# Patient Record
Sex: FEMALE | Race: WHITE | Employment: FULL TIME | URBAN - METROPOLITAN AREA
[De-identification: names, ages, dates, MRNs, and addresses within clinical notes are randomized per-mention and may not be internally consistent; named-entity substitution may affect disease eponyms.]

---

## 2020-02-01 ENCOUNTER — APPOINTMENT (OUTPATIENT)
Dept: GENERAL RADIOLOGY | Facility: HOSPITAL | Age: 45
DRG: 872 | End: 2020-02-01
Attending: EMERGENCY MEDICINE
Payer: COMMERCIAL

## 2020-02-01 ENCOUNTER — HOSPITAL ENCOUNTER (INPATIENT)
Facility: HOSPITAL | Age: 45
LOS: 6 days | Discharge: NURSING, REHAB OR LONG-TERM CARE | DRG: 872 | End: 2020-02-07
Attending: EMERGENCY MEDICINE | Admitting: HOSPITALIST
Payer: COMMERCIAL

## 2020-02-01 ENCOUNTER — APPOINTMENT (OUTPATIENT)
Dept: ULTRASOUND IMAGING | Facility: HOSPITAL | Age: 45
DRG: 872 | End: 2020-02-01
Attending: EMERGENCY MEDICINE
Payer: COMMERCIAL

## 2020-02-01 DIAGNOSIS — L03.116 LEFT LEG CELLULITIS: Primary | ICD-10-CM

## 2020-02-01 LAB
ALBUMIN SERPL-MCNC: 2.8 G/DL (ref 3.4–5)
ALBUMIN/GLOB SERPL: 0.5 {RATIO} (ref 1–2)
ALP LIVER SERPL-CCNC: 124 U/L (ref 37–98)
ALT SERPL-CCNC: 24 U/L (ref 13–56)
ANION GAP SERPL CALC-SCNC: 9 MMOL/L (ref 0–18)
AST SERPL-CCNC: 18 U/L (ref 15–37)
BASOPHILS # BLD: 0 X10(3) UL (ref 0–0.2)
BASOPHILS NFR BLD: 0 %
BILIRUB SERPL-MCNC: 0.5 MG/DL (ref 0.1–2)
BUN BLD-MCNC: 19 MG/DL (ref 7–18)
BUN/CREAT SERPL: 18.6 (ref 10–20)
CALCIUM BLD-MCNC: 9 MG/DL (ref 8.5–10.1)
CHLORIDE SERPL-SCNC: 100 MMOL/L (ref 98–112)
CO2 SERPL-SCNC: 29 MMOL/L (ref 21–32)
CREAT BLD-MCNC: 1.02 MG/DL (ref 0.55–1.02)
DEPRECATED RDW RBC AUTO: 54.9 FL (ref 35.1–46.3)
EOSINOPHIL # BLD: 0.21 X10(3) UL (ref 0–0.7)
EOSINOPHIL NFR BLD: 1 %
ERYTHROCYTE [DISTWIDTH] IN BLOOD BY AUTOMATED COUNT: 14.8 % (ref 11–15)
GLOBULIN PLAS-MCNC: 5.6 G/DL (ref 2.8–4.4)
GLUCOSE BLD-MCNC: 131 MG/DL (ref 70–99)
HCT VFR BLD AUTO: 41.1 % (ref 35–48)
HGB BLD-MCNC: 13 G/DL (ref 12–16)
LACTATE SERPL-SCNC: 1.4 MMOL/L (ref 0.4–2)
LACTATE SERPL-SCNC: 2.8 MMOL/L (ref 0.4–2)
LYMPHOCYTES NFR BLD: 0.21 X10(3) UL (ref 1–4)
LYMPHOCYTES NFR BLD: 1 %
M PROTEIN MFR SERPL ELPH: 8.4 G/DL (ref 6.4–8.2)
MCH RBC QN AUTO: 31.6 PG (ref 26–34)
MCHC RBC AUTO-ENTMCNC: 31.6 G/DL (ref 31–37)
MCV RBC AUTO: 99.8 FL (ref 80–100)
MONOCYTES # BLD: 0.21 X10(3) UL (ref 0.1–1)
MONOCYTES NFR BLD: 1 %
MORPHOLOGY: NORMAL
NEUTROPHILS # BLD AUTO: 18.24 X10 (3) UL (ref 1.5–7.7)
NEUTROPHILS NFR BLD: 94 %
NEUTS BAND NFR BLD: 3 %
NEUTS HYPERSEG # BLD: 20.47 X10(3) UL (ref 1.5–7.7)
OSMOLALITY SERPL CALC.SUM OF ELEC: 290 MOSM/KG (ref 275–295)
PLATELET # BLD AUTO: 221 10(3)UL (ref 150–450)
PLATELET MORPHOLOGY: NORMAL
POTASSIUM SERPL-SCNC: 3.5 MMOL/L (ref 3.5–5.1)
RBC # BLD AUTO: 4.12 X10(6)UL (ref 3.8–5.3)
SODIUM SERPL-SCNC: 138 MMOL/L (ref 136–145)
TOTAL CELLS COUNTED: 100
WBC # BLD AUTO: 21.1 X10(3) UL (ref 4–11)

## 2020-02-01 PROCEDURE — 99223 1ST HOSP IP/OBS HIGH 75: CPT | Performed by: INTERNAL MEDICINE

## 2020-02-01 PROCEDURE — 73590 X-RAY EXAM OF LOWER LEG: CPT | Performed by: EMERGENCY MEDICINE

## 2020-02-01 PROCEDURE — 93971 EXTREMITY STUDY: CPT | Performed by: EMERGENCY MEDICINE

## 2020-02-01 RX ORDER — CEFAZOLIN SODIUM/WATER 2 G/20 ML
2 SYRINGE (ML) INTRAVENOUS ONCE
Status: COMPLETED | OUTPATIENT
Start: 2020-02-01 | End: 2020-02-01

## 2020-02-01 RX ORDER — SODIUM CHLORIDE 9 MG/ML
INJECTION, SOLUTION INTRAVENOUS CONTINUOUS
Status: DISCONTINUED | OUTPATIENT
Start: 2020-02-01 | End: 2020-02-05

## 2020-02-01 RX ORDER — CEFAZOLIN SODIUM/WATER 2 G/20 ML
2 SYRINGE (ML) INTRAVENOUS EVERY 8 HOURS
Status: DISCONTINUED | OUTPATIENT
Start: 2020-02-01 | End: 2020-02-02

## 2020-02-01 RX ORDER — ONDANSETRON 2 MG/ML
4 INJECTION INTRAMUSCULAR; INTRAVENOUS EVERY 6 HOURS PRN
Status: DISCONTINUED | OUTPATIENT
Start: 2020-02-01 | End: 2020-02-07

## 2020-02-01 RX ORDER — ACETAMINOPHEN 325 MG/1
650 TABLET ORAL EVERY 6 HOURS PRN
Status: DISCONTINUED | OUTPATIENT
Start: 2020-02-01 | End: 2020-02-07

## 2020-02-01 RX ORDER — METOCLOPRAMIDE HYDROCHLORIDE 5 MG/ML
10 INJECTION INTRAMUSCULAR; INTRAVENOUS EVERY 8 HOURS PRN
Status: DISCONTINUED | OUTPATIENT
Start: 2020-02-01 | End: 2020-02-07

## 2020-02-01 RX ORDER — IBUPROFEN 400 MG/1
400 TABLET ORAL EVERY 6 HOURS PRN
Status: DISCONTINUED | OUTPATIENT
Start: 2020-02-01 | End: 2020-02-07

## 2020-02-01 RX ORDER — MAGNESIUM OXIDE 400 MG (241.3 MG MAGNESIUM) TABLET
1 TABLET NIGHTLY PRN
Status: DISCONTINUED | OUTPATIENT
Start: 2020-02-01 | End: 2020-02-07

## 2020-02-01 RX ORDER — HEPARIN SODIUM 5000 [USP'U]/ML
5000 INJECTION, SOLUTION INTRAVENOUS; SUBCUTANEOUS EVERY 8 HOURS SCHEDULED
Status: DISCONTINUED | OUTPATIENT
Start: 2020-02-01 | End: 2020-02-03

## 2020-02-01 NOTE — ED PROVIDER NOTES
Patient Seen in: [facility] Emergency Department      History   Patient presents with:  Cellulitis    Stated Complaint: lower left leg swelling/redness/blistering, r/o cellulitis    HPI    Patient has not seen a doctor in more than 5 years.   She rec oral lesions. Lungs: Clear to auscultation bilaterally. No rhonchi or rales. Heart: Normal S1 and S2, without murmur or rub. Distal pulses are strong and symmetric. Abdomen: Soft, morbidly obese but nondistended and completely nontender.   Extremities: Abnormal            Final result                 Please view results for these tests on the individual orders.    LACTIC ACID 3 HR POST POSITIVE   RAINBOW DRAW BLUE   RAINBOW DRAW LAVENDER   RAINBOW DRAW LIGHT GREEN   RAINBOW DRAW GOLD   BLOOD CULT Prescribed:  There are no discharge medications for this patient.                  Present on Admission           ICD-10-CM Noted POA    Left leg cellulitis L03.116 2/1/2020 Unknown

## 2020-02-01 NOTE — H&P
[facility]  History and Physical     Haley June Patient Status:  Emergency    1975 MRN [id number]   Location 6[address] [facility] Attending Duc León MD   Hosp Day # 0 PCP No primary care provider on file.      [place] T[place] Abdomen: Soft, nontender, nondistended. Positive bowel sounds. No rebound, guarding or organomegaly. Neurologic: No focal neurological deficits. CNII-XII grossly intact. Musculoskeletal: Moves all extremities. Extremities: No edema or cyanosis.   Integu

## 2020-02-02 LAB
ANION GAP SERPL CALC-SCNC: 9 MMOL/L (ref 0–18)
BASOPHILS # BLD: 0 X10(3) UL (ref 0–0.2)
BASOPHILS NFR BLD: 0 %
BUN BLD-MCNC: 15 MG/DL (ref 7–18)
BUN/CREAT SERPL: 19.5 (ref 10–20)
CALCIUM BLD-MCNC: 7.8 MG/DL (ref 8.5–10.1)
CHLORIDE SERPL-SCNC: 102 MMOL/L (ref 98–112)
CHOLEST SMN-MCNC: 99 MG/DL (ref ?–200)
CO2 SERPL-SCNC: 27 MMOL/L (ref 21–32)
CREAT BLD-MCNC: 0.77 MG/DL (ref 0.55–1.02)
DEPRECATED RDW RBC AUTO: 55.6 FL (ref 35.1–46.3)
EOSINOPHIL # BLD: 0.18 X10(3) UL (ref 0–0.7)
EOSINOPHIL NFR BLD: 1 %
ERYTHROCYTE [DISTWIDTH] IN BLOOD BY AUTOMATED COUNT: 15 % (ref 11–15)
EST. AVERAGE GLUCOSE BLD GHB EST-MCNC: 146 MG/DL (ref 68–126)
GLUCOSE BLD-MCNC: 126 MG/DL (ref 70–99)
HBA1C MFR BLD HPLC: 6.7 % (ref ?–5.7)
HCT VFR BLD AUTO: 38.7 % (ref 35–48)
HDLC SERPL-MCNC: 9 MG/DL (ref 40–59)
HGB BLD-MCNC: 12 G/DL (ref 12–16)
LDLC SERPL CALC-MCNC: 59 MG/DL (ref ?–100)
LYMPHOCYTES NFR BLD: 1.76 X10(3) UL (ref 1–4)
LYMPHOCYTES NFR BLD: 10 %
MCH RBC QN AUTO: 31.3 PG (ref 26–34)
MCHC RBC AUTO-ENTMCNC: 31 G/DL (ref 31–37)
MCV RBC AUTO: 100.8 FL (ref 80–100)
METAMYELOCYTES # BLD: 0.35 X10(3) UL
METAMYELOCYTES NFR BLD: 2 %
MONOCYTES # BLD: 0.18 X10(3) UL (ref 0.1–1)
MONOCYTES NFR BLD: 1 %
NEUTROPHILS # BLD AUTO: 13.99 X10 (3) UL (ref 1.5–7.7)
NEUTROPHILS NFR BLD: 81 %
NEUTS BAND NFR BLD: 5 %
NEUTS HYPERSEG # BLD: 15.14 X10(3) UL (ref 1.5–7.7)
NONHDLC SERPL-MCNC: 90 MG/DL (ref ?–130)
OSMOLALITY SERPL CALC.SUM OF ELEC: 288 MOSM/KG (ref 275–295)
PLATELET # BLD AUTO: 192 10(3)UL (ref 150–450)
POTASSIUM SERPL-SCNC: 3.7 MMOL/L (ref 3.5–5.1)
RBC # BLD AUTO: 3.84 X10(6)UL (ref 3.8–5.3)
SODIUM SERPL-SCNC: 138 MMOL/L (ref 136–145)
TOTAL CELLS COUNTED: 100
TRIGL SERPL-MCNC: 155 MG/DL (ref 30–149)
VLDLC SERPL CALC-MCNC: 31 MG/DL (ref 0–30)
WBC # BLD AUTO: 17.6 X10(3) UL (ref 4–11)

## 2020-02-02 PROCEDURE — 99232 SBSQ HOSP IP/OBS MODERATE 35: CPT | Performed by: INTERNAL MEDICINE

## 2020-02-02 RX ORDER — POTASSIUM CHLORIDE 20 MEQ/1
40 TABLET, EXTENDED RELEASE ORAL ONCE
Status: COMPLETED | OUTPATIENT
Start: 2020-02-02 | End: 2020-02-02

## 2020-02-02 RX ORDER — CLINDAMYCIN PHOSPHATE 900 MG/50ML
900 INJECTION INTRAVENOUS EVERY 8 HOURS
Status: DISCONTINUED | OUTPATIENT
Start: 2020-02-02 | End: 2020-02-07

## 2020-02-02 RX ORDER — TRAMADOL HYDROCHLORIDE 50 MG/1
50 TABLET ORAL EVERY 6 HOURS PRN
Status: DISCONTINUED | OUTPATIENT
Start: 2020-02-02 | End: 2020-02-07

## 2020-02-02 NOTE — PLAN OF CARE
NURSING ADMISSION NOTE      Patient admitted via Cart  Oriented to room. Safety precautions initiated. Bed in low position. Call light in reach. Admission navigator completed. Dr. Dhiraj Bronson saw patient in ER.

## 2020-02-02 NOTE — PLAN OF CARE
Complained of discomfort in left leg, tylenol given with some effect. New order for motrin , same given. Iv antibiotics given. melatonin for sleep. Left leg elevated.

## 2020-02-02 NOTE — CONSULTS
INFECTIOUS DISEASE CONSULTATION    Lian Carvalho Patient Status:  Inpatient    1975 MRN [id number]   [facility] 4NW-A Attending Aj Lutz MD   [facility] Day # 1 PCP No primary ca (MOTRIN) tab 400 mg, 400 mg, Oral, Q6H PRN  •  melatonin tab TABS 1 mg, 1 mg, Oral, Nightly PRN  No current facility-administered medications on file prior to encounter. No current outpatient medications on file prior to encounter.       Review of Systems seen N/A    Aerobic Smear No organisms seen N/A       Established Problem list:  Patient Active Problem List:     Left leg cellulitis     Sepsis ([facility] [address]     Benign essential HTN      Illness risk level  Right leg cellulitis risk to bodily function, systemic

## 2020-02-02 NOTE — PROGRESS NOTES
[facility]  Progress Note     kameron Garcia Patient Status:  Inpatient    1975 MRN [id number]   [facility] 4NW-A Attending Denise Hyatt MD   [facility] Day # 1 PCP No primary care provider on file.      Chief Complaint: L leg c Intravenous Q8H       ASSESSMENT / PLAN:     1. LLE Cellulitis  1. Continue IV abx  2. Follow cultures  3. US negative for DVT  4. ID to eval this AM  2. Sepsis      1. Received fluid bolus in ED  2. IVF  3. IV abx  4. Trend lactic, now normal  5.  Monitor

## 2020-02-03 LAB — POTASSIUM SERPL-SCNC: 4 MMOL/L (ref 3.5–5.1)

## 2020-02-03 PROCEDURE — 99232 SBSQ HOSP IP/OBS MODERATE 35: CPT | Performed by: INTERNAL MEDICINE

## 2020-02-03 RX ORDER — HEPARIN SODIUM 5000 [USP'U]/ML
7500 INJECTION, SOLUTION INTRAVENOUS; SUBCUTANEOUS EVERY 8 HOURS SCHEDULED
Status: DISCONTINUED | OUTPATIENT
Start: 2020-02-03 | End: 2020-02-07

## 2020-02-03 NOTE — PLAN OF CARE
1915 received patient in handoff. Denies need for pain med at this time but requests at [facility] 34 as she slept poorly last night. Left leg continues red and swollen. Redness marked. Warm to touch. Up to bathroom with SBA. States much less painful tonight to walk.

## 2020-02-03 NOTE — PROGRESS NOTES
[facility]                INFECTIOUS DISEASE PROGRESS NOTE    Yamilex Da Patient Status:  Inpatient    1975 MRN [id number]   [facility] 4NW-A Attending Cyntha Leventhal, MD   [facility] Day # 2 PCP No primary care provider on Status: Abnormal (Preliminary result)    Collection Time: 02/01/20  2:34 PM   Result Value Ref Range    Aerobic Culture Result (A) N/A     1+ growth Streptococcus pyogenes (Grp A beta strep)    Aerobic Smear No WBCs seen N/A    Aerobic Smear No organisms s

## 2020-02-03 NOTE — PROGRESS NOTES
[facility]  Progress Note     Stephane Bates Patient Status:  Inpatient    1975 MRN [id number]   [facility] 4NW-A Attending Rubén Diamond MD   [address] Day # 2 PCP No primary care provider on file.      Chief Complaint: L leg c in [place]. Medications:   • clindamycin  900 mg Intravenous Q8H   • penicillin G potassium  4 Million Units Intravenous Q4H   • Heparin Sodium (Porcine)  5,000 Units Subcutaneous Q8H [address]       ASSESSMENT / PLAN:     1. LLE Cellulitis  1.  Continue IV abx p

## 2020-02-03 NOTE — CM/SW NOTE
Patient noted to not have a primary care MD. [address] [facility] MD list given. CM/SW contact information given if additional needs arise.     Myra PIEDRA, 02/03/20, 3:48 PM

## 2020-02-03 NOTE — PLAN OF CARE
A&Ox4, VSS. Afebrile this shift. Up with standby assist. Rosangela Shaver provided as well to help with mobility. LLE cellulitis- blisters popped, leg weeping. Elevated extremity on pillows.  Leg cleansed with sterile water this evening, patient reported leg felt b Advance activity as appropriate  - Communicate ordered activity level and limitations with patient/family  Outcome: Progressing  Goal: Maintain proper alignment of affected body part  Description  INTERVENTIONS:  - Support and protect limb and body alignme devices as appropriate  - Consider OT/PT consult to assist with strengthening/mobility  - Encourage toileting schedule  Outcome: Progressing

## 2020-02-03 NOTE — PAYOR COMM NOTE
--------------  ADMISSION REVIEW     Payor: Heather Vandivermikayla JAIMES #:  [id number]  Authorization Number: [id number]    Admit date: 2/1/20  Admit time: 1805       Patient Seen in: [facility] Emergency Department    History   Patient presents with:  Meaghan left lower leg is brightly erythematous and swollen. There is some partial skin thickness ulceration noted over the anterior shin with weeping of serous fluid. The erythema is fairly sharply demarcated and extends to about the level of the knee.   The kne antibiotic    Disposition and Plan     Clinical Impression:  Left leg cellulitis  (primary encounter diagnosis)    Disposition:  Admit  2/1/2020  5:35 pm        [facility]  History and Physical     Chief Complaint: L leg cellulitis     History of P K 3.5      CO2 29.0   ALKPHO 124*   AST 18   ALT 24   BILT 0.5   TP 8.4*     ASSESSMENT / PLAN:     1. LLE Cellulitis  1. Continue IV abx  2. Follow cultures  3. F/U US  4. Consider ID eval if no improvement  2. Sepsis   1.  Received fluid bolus in    1. LLE Cellulitis  1. Continue IV abx  2. Follow cultures  3. US negative for DVT  4. ID to eval this AM  2. Sepsis      1. Received fluid bolus in ED  2. IVF  3. IV abx  4. Trend lactic, now normal  5. Monitor CBC  3. Morbid Obesity  1. BMI 51  2.  Ad Intravenous Q4H   • Heparin Sodium (Porcine)  5,000 Units Subcutaneous [facility]         ASSESSMENT / PLAN:      1. LLE Cellulitis  1. Continue IV abx per ID  2. Follow cultures  3. US negative for DVT  4. ID following  2. Sepsis      1. VS improved  2.  IVF Action Dose Route     2/2/2020 2208 Given 50 mg Oral

## 2020-02-03 NOTE — PROGRESS NOTES
[facility] Note:  Anticoagulation Weight Dose Adjustment for heparin    Chantel Foster is a 38year old female who has been prescribed heparin 5000 units every 8 hours. Estimated Creatinine Clearance: 79.7 mL/min (based on SCr of 0.77 mg/dL).  Body

## 2020-02-04 ENCOUNTER — APPOINTMENT (OUTPATIENT)
Dept: MRI IMAGING | Facility: HOSPITAL | Age: 45
DRG: 872 | End: 2020-02-04
Attending: INTERNAL MEDICINE
Payer: COMMERCIAL

## 2020-02-04 LAB
ANION GAP SERPL CALC-SCNC: 5 MMOL/L (ref 0–18)
BASOPHILS # BLD: 0 X10(3) UL (ref 0–0.2)
BASOPHILS NFR BLD: 0 %
BUN BLD-MCNC: 11 MG/DL (ref 7–18)
BUN/CREAT SERPL: 16.9 (ref 10–20)
CALCIUM BLD-MCNC: 8.3 MG/DL (ref 8.5–10.1)
CHLORIDE SERPL-SCNC: 102 MMOL/L (ref 98–112)
CO2 SERPL-SCNC: 27 MMOL/L (ref 21–32)
CREAT BLD-MCNC: 0.65 MG/DL (ref 0.55–1.02)
DEPRECATED RDW RBC AUTO: 58.1 FL (ref 35.1–46.3)
EOSINOPHIL # BLD: 0.18 X10(3) UL (ref 0–0.7)
EOSINOPHIL NFR BLD: 1 %
ERYTHROCYTE [DISTWIDTH] IN BLOOD BY AUTOMATED COUNT: 15.9 % (ref 11–15)
GLUCOSE BLD-MCNC: 127 MG/DL (ref 70–99)
HCT VFR BLD AUTO: 34.5 % (ref 35–48)
HGB BLD-MCNC: 10.7 G/DL (ref 12–16)
LYMPHOCYTES NFR BLD: 17 %
LYMPHOCYTES NFR BLD: 3.08 X10(3) UL (ref 1–4)
MCH RBC QN AUTO: 30.9 PG (ref 26–34)
MCHC RBC AUTO-ENTMCNC: 31 G/DL (ref 31–37)
MCV RBC AUTO: 99.7 FL (ref 80–100)
METAMYELOCYTES # BLD: 0.18 X10(3) UL
METAMYELOCYTES NFR BLD: 1 %
MONOCYTES # BLD: 0.54 X10(3) UL (ref 0.1–1)
MONOCYTES NFR BLD: 3 %
MORPHOLOGY: NORMAL
NEUTROPHILS # BLD AUTO: 12.8 X10 (3) UL (ref 1.5–7.7)
NEUTROPHILS NFR BLD: 69 %
NEUTS BAND NFR BLD: 9 %
NEUTS HYPERSEG # BLD: 14.12 X10(3) UL (ref 1.5–7.7)
OSMOLALITY SERPL CALC.SUM OF ELEC: 279 MOSM/KG (ref 275–295)
PLATELET # BLD AUTO: 196 10(3)UL (ref 150–450)
PLATELET MORPHOLOGY: NORMAL
POTASSIUM SERPL-SCNC: 4 MMOL/L (ref 3.5–5.1)
RBC # BLD AUTO: 3.46 X10(6)UL (ref 3.8–5.3)
SODIUM SERPL-SCNC: 134 MMOL/L (ref 136–145)
TOTAL CELLS COUNTED: 100
WBC # BLD AUTO: 18.1 X10(3) UL (ref 4–11)

## 2020-02-04 PROCEDURE — 73720 MRI LWR EXTREMITY W/O&W/DYE: CPT | Performed by: INTERNAL MEDICINE

## 2020-02-04 PROCEDURE — 99232 SBSQ HOSP IP/OBS MODERATE 35: CPT | Performed by: INTERNAL MEDICINE

## 2020-02-04 NOTE — PLAN OF CARE
1915 received patient in handoff. L arm appeared more swollen and patient stated it hurt. IV removed and restarted in RFA. Left leg with many more blisters unbroken and broken, Leg still red and warm to touch. Very swollen. Elevated on 2 pillows.  Leg wrap

## 2020-02-04 NOTE — PLAN OF CARE
Patient received this am alert and oriented, resting in bed, lungs diminished on 2 liters nasal cannula, abdomen soft, bowel sounds present, passing flatus, voiding adequate amounts, asymptomatic, patient denies pain while leg is elevated in bed, dressing.

## 2020-02-04 NOTE — CONSULTS
[facility]  Report of Inpatient Wound Care Consultation     Yamilex Roberson Patient Status:  Inpatient    1975 MRN [id number]   [facility] 4NW-A 407/407-A Attending Cyntha Leventhal, [address] [facility] Day # 3 PCP No primary care provider Length (cm): 20cm  Width (cm): circumferential  Depth (cm): 0.1    Hypergranulation: No    Tunneling:  No    Undermining:  No    Sinus Tract:  No     Wound Description:    Wound Encounter: Initial  Thickness: Partial Thickness  Exudate Amount:  Moderate patient. Please call me at the Inpatient Wound Care pager at #[phone] if you have any questions about this consultation and plan of care. Time Spent 30 Minutes.     Thank you,     Gloria Beckford RN, BSN, [facility]

## 2020-02-04 NOTE — PROGRESS NOTES
[facility]  Progress Note     Gumaro Key Patient Status:  Inpatient    1975 MRN [id number]   [facility] 4NW-A Attending Perfecto Sullivan MD   [facility] Day # 3 PCP No primary care provider on file.      Chief Complaint: L leg c mL/min (based on SCr of 0.65 mg/dL). No results for input(s): PTP, INR in the last 168 hours. No results for input(s): TROP, CK in the last 168 hours. Imaging: Imaging data reviewed in Epic.     Medications:   • Heparin Sodium (Porcine)  7,500

## 2020-02-04 NOTE — PROGRESS NOTES
[facility]                INFECTIOUS DISEASE PROGRESS NOTE    Yamilex Da Patient Status:  Inpatient    1975 MRN [id number]   [facility] 4NW-A Attending Cyntha Leventhal, MD   [facility] Day # 3 PCP No primary care provider on Range    Blood Culture Result No Growth 2 Days N/A   2.  AEROBIC BACTERIAL CULTURE     Status: Abnormal    Collection Time: 02/01/20  2:34 PM   Result Value Ref Range    Aerobic Culture Result (A) N/A     1+ growth Streptococcus pyogenes (Grp A beta strep)

## 2020-02-05 LAB
ANION GAP SERPL CALC-SCNC: 3 MMOL/L (ref 0–18)
BASOPHILS # BLD: 0 X10(3) UL (ref 0–0.2)
BASOPHILS NFR BLD: 0 %
BUN BLD-MCNC: 9 MG/DL (ref 7–18)
BUN/CREAT SERPL: 17.6 (ref 10–20)
CALCIUM BLD-MCNC: 8.1 MG/DL (ref 8.5–10.1)
CHLORIDE SERPL-SCNC: 101 MMOL/L (ref 98–112)
CK SERPL-CCNC: 23 U/L (ref 26–192)
CO2 SERPL-SCNC: 28 MMOL/L (ref 21–32)
CREAT BLD-MCNC: 0.51 MG/DL (ref 0.55–1.02)
DEPRECATED RDW RBC AUTO: 59.9 FL (ref 35.1–46.3)
EOSINOPHIL # BLD: 0 X10(3) UL (ref 0–0.7)
EOSINOPHIL NFR BLD: 0 %
ERYTHROCYTE [DISTWIDTH] IN BLOOD BY AUTOMATED COUNT: 15.9 % (ref 11–15)
GLUCOSE BLD-MCNC: 131 MG/DL (ref 70–99)
GLUCOSE BLD-MCNC: 132 MG/DL (ref 70–99)
GLUCOSE BLD-MCNC: 136 MG/DL (ref 70–99)
HCT VFR BLD AUTO: 35.1 % (ref 35–48)
HGB BLD-MCNC: 10.9 G/DL (ref 12–16)
LYMPHOCYTES NFR BLD: 15 %
LYMPHOCYTES NFR BLD: 2.64 X10(3) UL (ref 1–4)
MCH RBC QN AUTO: 31.7 PG (ref 26–34)
MCHC RBC AUTO-ENTMCNC: 31.1 G/DL (ref 31–37)
MCV RBC AUTO: 102 FL (ref 80–100)
METAMYELOCYTES # BLD: 0.35 X10(3) UL
METAMYELOCYTES NFR BLD: 2 %
MONOCYTES # BLD: 0.53 X10(3) UL (ref 0.1–1)
MONOCYTES NFR BLD: 3 %
MYELOCYTES # BLD: 0.35 X10(3) UL
MYELOCYTES NFR BLD: 2 %
NEUTROPHILS # BLD AUTO: 11.37 X10 (3) UL (ref 1.5–7.7)
NEUTROPHILS NFR BLD: 73 %
NEUTS BAND NFR BLD: 5 %
NEUTS HYPERSEG # BLD: 13.73 X10(3) UL (ref 1.5–7.7)
OSMOLALITY SERPL CALC.SUM OF ELEC: 274 MOSM/KG (ref 275–295)
PLATELET # BLD AUTO: 200 10(3)UL (ref 150–450)
PLATELET MORPHOLOGY: NORMAL
POTASSIUM SERPL-SCNC: 4.3 MMOL/L (ref 3.5–5.1)
RBC # BLD AUTO: 3.44 X10(6)UL (ref 3.8–5.3)
SODIUM SERPL-SCNC: 132 MMOL/L (ref 136–145)
TOTAL CELLS COUNTED: 100
WBC # BLD AUTO: 17.6 X10(3) UL (ref 4–11)

## 2020-02-05 PROCEDURE — 99232 SBSQ HOSP IP/OBS MODERATE 35: CPT | Performed by: HOSPITALIST

## 2020-02-05 RX ORDER — DEXTROSE MONOHYDRATE 25 G/50ML
50 INJECTION, SOLUTION INTRAVENOUS
Status: DISCONTINUED | OUTPATIENT
Start: 2020-02-05 | End: 2020-02-07

## 2020-02-05 NOTE — PROGRESS NOTES
[facility]  Progress Note     Raoul Castillo Patient Status:  Inpatient    1975 MRN [id number]   [facility] 4NW-A Attending Emma Dunn MD   [facility] Day # 4 PCP No primary care provider on file.      Chief Complaint: L leg c (A) (based on SCr of 0.51 mg/dL (L)). No results for input(s): PTP, INR in the last 168 hours. Recent Labs   Lab 02/05/20  0612   CK 23*            Imaging: Imaging data reviewed in Epic.     Medications:   • Heparin Sodium (Porcine)  7,500 Units Subc

## 2020-02-05 NOTE — PLAN OF CARE
Patient received this am alert and oriented, resting in bed, left leg dressed with minimal serous drainage, elevated on 2 pillows.  Lungs diminished on room air, saturations > 94%, no cough noted, no SOB at rest. Abdomen soft, bowel sounds present, passing

## 2020-02-05 NOTE — PLAN OF CARE
1915 received patient in handoff. To MRI at 2030. Returned. IV fluidsa and antibiotics per orders. On [place] protocol.  o2 2 liters at noc. Desats in 70's on O2 2 liters in a deep sleep. Recovers on own. Drsg change to leg at HS.  Copious amts of yellowish

## 2020-02-05 NOTE — CM/SW NOTE
02/05/20 1300   CM/SW Referral Data   Referral Source Social Work (self-referral)   Reason for Referral Discharge planning   Patient Info   Patient's Mental Status Alert;Oriented   Discharge Needs   Anticipated D/C needs To be determined   SW received a

## 2020-02-05 NOTE — PAYOR COMM NOTE
REF: [id number] - APPROVED 2/1/20-2/4/20- [facility] [employer] FOR 2/4/20-2/5/20 2/4/20  Chief Complaint: L leg cellulitis     S: Patient without acute events overnight. Has noticed more blisters to her L leg. Desats with sleep.  Hads her legs dressed yesterd improved  2. IVF  3. IV abx  4. Trend lactic, now normal  5. Monitor CBC  3. Morbid Obesity  1. BMI 51  2. Advised weight loss  4. Hyperglycemia  1. Check A1c, 6.7  5. Presumed [place]/Obesity Hypoventilation  1. O2 as needed  2.  Needs sleep study as outpatien --    AST 18  --   --   --   --    ALT 24  --   --   --   --    BILT 0.5  --   --   --   --    TP 8.4*  --   --   --   --          Lab 02/05/20  0612   CK 23*       Medications:   • Heparin Sodium (Porcine)  7,500 Units Subcutaneous Q8H [address]   • clindamycin in sodium chloride 0.9% 100 mL IVPB       Date Action Dose Route     2/5/2020 1243 New Bag 4 Million Units Intravenous     2/5/2020 0837 New Bag 4 Million Units Intravenous     2/5/2020 0448 New Bag 4 Million Units Intravenous     2/5/2020 0111 New Bag 4 M

## 2020-02-05 NOTE — PROGRESS NOTES
[facility]                INFECTIOUS DISEASE PROGRESS NOTE    Sobeida Garcia Patient Status:  Inpatient    1975 MRN [id number]   [facility] 4NW-A Attending Denise Hyatt MD   Hosp Day # 4 PCP No primary care provider on --        No results found for: [facility] Encounter on 02/01/20   1.  BLOOD CULTURE     Status: None (Preliminary result)    Collection Time: 02/01/20  2:49 PM   Result Value Ref Range    Blood Culture Result No Growth 3 Days N/A   2

## 2020-02-06 LAB
ANION GAP SERPL CALC-SCNC: 3 MMOL/L (ref 0–18)
BASOPHILS # BLD: 0 X10(3) UL (ref 0–0.2)
BASOPHILS NFR BLD: 0 %
BUN BLD-MCNC: 13 MG/DL (ref 7–18)
BUN/CREAT SERPL: 21.3 (ref 10–20)
CALCIUM BLD-MCNC: 8.2 MG/DL (ref 8.5–10.1)
CHLORIDE SERPL-SCNC: 100 MMOL/L (ref 98–112)
CO2 SERPL-SCNC: 28 MMOL/L (ref 21–32)
CREAT BLD-MCNC: 0.61 MG/DL (ref 0.55–1.02)
DEPRECATED RDW RBC AUTO: 59.8 FL (ref 35.1–46.3)
EOSINOPHIL # BLD: 0 X10(3) UL (ref 0–0.7)
EOSINOPHIL NFR BLD: 0 %
ERYTHROCYTE [DISTWIDTH] IN BLOOD BY AUTOMATED COUNT: 16.2 % (ref 11–15)
GLUCOSE BLD-MCNC: 108 MG/DL (ref 70–99)
GLUCOSE BLD-MCNC: 110 MG/DL (ref 70–99)
GLUCOSE BLD-MCNC: 131 MG/DL (ref 70–99)
GLUCOSE BLD-MCNC: 135 MG/DL (ref 70–99)
HCT VFR BLD AUTO: 36.1 % (ref 35–48)
HGB BLD-MCNC: 11.2 G/DL (ref 12–16)
LYMPHOCYTES NFR BLD: 16 %
LYMPHOCYTES NFR BLD: 3.42 X10(3) UL (ref 1–4)
MCH RBC QN AUTO: 31.2 PG (ref 26–34)
MCHC RBC AUTO-ENTMCNC: 31 G/DL (ref 31–37)
MCV RBC AUTO: 100.6 FL (ref 80–100)
METAMYELOCYTES # BLD: 0.64 X10(3) UL
METAMYELOCYTES NFR BLD: 3 %
MONOCYTES # BLD: 0.43 X10(3) UL (ref 0.1–1)
MONOCYTES NFR BLD: 2 %
MYELOCYTES # BLD: 1.5 X10(3) UL
MYELOCYTES NFR BLD: 7 %
NEUTROPHILS # BLD AUTO: 12.7 X10 (3) UL (ref 1.5–7.7)
NEUTROPHILS NFR BLD: 67 %
NEUTS BAND NFR BLD: 5 %
NEUTS HYPERSEG # BLD: 15.41 X10(3) UL (ref 1.5–7.7)
NRBC BLD MANUAL-RTO: 4 %
OSMOLALITY SERPL CALC.SUM OF ELEC: 274 MOSM/KG (ref 275–295)
PLATELET # BLD AUTO: 249 10(3)UL (ref 150–450)
PLATELET MORPHOLOGY: NORMAL
POTASSIUM SERPL-SCNC: 4.5 MMOL/L (ref 3.5–5.1)
RBC # BLD AUTO: 3.59 X10(6)UL (ref 3.8–5.3)
SODIUM SERPL-SCNC: 131 MMOL/L (ref 136–145)
TOTAL CELLS COUNTED: 100
WBC # BLD AUTO: 21.4 X10(3) UL (ref 4–11)

## 2020-02-06 PROCEDURE — 99232 SBSQ HOSP IP/OBS MODERATE 35: CPT | Performed by: HOSPITALIST

## 2020-02-06 RX ORDER — CEFAZOLIN SODIUM/WATER 2 G/20 ML
2 SYRINGE (ML) INTRAVENOUS EVERY 6 HOURS
Status: DISCONTINUED | OUTPATIENT
Start: 2020-02-06 | End: 2020-02-07

## 2020-02-06 NOTE — PHYSICAL THERAPY NOTE
Attempted to see pt for scheduled time of 815 for PT. D/w RN, pt drowsy, requesting PT return at a later time.  Will f/u as schedule permits in PM.

## 2020-02-06 NOTE — PLAN OF CARE
Alert and oriented x4. VSS. Afebrile. No c/o of pain or SOB. Room air. Leg is swollen, red, and warm to touch. Wound dressing changed x2, many blisters to the area, some intact and some draining. Pt tolerated well.  C/o of pain in leg when ambulating to bat

## 2020-02-06 NOTE — PHYSICAL THERAPY NOTE
PHYSICAL THERAPY EVALUATION - INPATIENT     Room Number: 407/407-A  Evaluation Date: 2/6/2020  Type of Evaluation: Initial  Physician Order: PT Eval and Treat    Presenting Problem: LLE cellulitis  Reason for Therapy: Mobility Dysfunction and Dischar SHORT FORM - BASIC MOBILITY  How much difficulty does the patient currently have. ..  -   Turning over in bed (including adjusting bedclothes, sheets and blankets)?: A Little   -   Sitting down on and standing up from a chair with arms (e.g., wheelchair, be addressed    ASSESSMENT   Patient is a 38year old female admitted on 2/1/2020 for LLE cellulitis. Pertinent comorbidities and personal factors impacting therapy include none.   In this PT evaluation, the patient presents with the following impairments dec

## 2020-02-06 NOTE — CM/SW NOTE
SW met w/pt regarding [facility] choice. Pt stating, \"I don't care where I go. \" Informed pt [facility] is closest to her house. Sent a referral via [facility]. DON called. Needs [facility] for IV ABX and wound care. Still awaiting PT eval. Pt previously refused PT.

## 2020-02-06 NOTE — PROGRESS NOTES
[facility]                INFECTIOUS DISEASE PROGRESS NOTE    Fiordaliza Clayton Patient Status:  Inpatient    1975 MRN [id number]   [facility] 4NW-A Attending Trice Angel MD   Hosp Day # 5 PCP No primary care provider on --   --   --   --    TP 8.4*  --   --   --   --     < > = values in this interval not displayed. No results found for: [facility] Encounter on 02/01/20   1.  BLOOD CULTURE     Status: None (Preliminary result)    Collection Time:

## 2020-02-06 NOTE — PROGRESS NOTES
[facility]  Progress Note     Raul Saenz Patient Status:  Inpatient    1975 MRN [id number]   [facility] 4NW-A Attending Sara Falk MD   Hosp Day # 5 PCP No primary care provider on file.      Chief Complaint: L leg c interval not displayed. Estimated Creatinine Clearance: 100.6 mL/min (based on SCr of 0.61 mg/dL). No results for input(s): PTP, INR in the last 168 hours.     Recent Labs   Lab 02/05/20  0612   CK 23*            Imaging: Imaging data reviewed in E

## 2020-02-07 VITALS
TEMPERATURE: 98 F | HEIGHT: 64 IN | SYSTOLIC BLOOD PRESSURE: 150 MMHG | BODY MASS INDEX: 50.02 KG/M2 | OXYGEN SATURATION: 92 % | HEART RATE: 95 BPM | DIASTOLIC BLOOD PRESSURE: 67 MMHG | WEIGHT: 293 LBS | RESPIRATION RATE: 16 BRPM

## 2020-02-07 LAB
ANION GAP SERPL CALC-SCNC: 3 MMOL/L (ref 0–18)
BUN BLD-MCNC: 13 MG/DL (ref 7–18)
BUN/CREAT SERPL: 21 (ref 10–20)
CALCIUM BLD-MCNC: 7.8 MG/DL (ref 8.5–10.1)
CHLORIDE SERPL-SCNC: 100 MMOL/L (ref 98–112)
CO2 SERPL-SCNC: 30 MMOL/L (ref 21–32)
CREAT BLD-MCNC: 0.62 MG/DL (ref 0.55–1.02)
GLUCOSE BLD-MCNC: 102 MG/DL (ref 70–99)
GLUCOSE BLD-MCNC: 112 MG/DL (ref 70–99)
GLUCOSE BLD-MCNC: 118 MG/DL (ref 70–99)
OSMOLALITY SERPL CALC.SUM OF ELEC: 277 MOSM/KG (ref 275–295)
POTASSIUM SERPL-SCNC: 4.7 MMOL/L (ref 3.5–5.1)
SODIUM SERPL-SCNC: 133 MMOL/L (ref 136–145)

## 2020-02-07 PROCEDURE — 99239 HOSP IP/OBS DSCHRG MGMT >30: CPT | Performed by: HOSPITALIST

## 2020-02-07 RX ORDER — CEFAZOLIN SODIUM/WATER 2 G/20 ML
2 SYRINGE (ML) INTRAVENOUS EVERY 8 HOURS
Qty: 600 ML | Refills: 1 | Status: ON HOLD | OUTPATIENT
Start: 2020-02-07 | End: 2020-02-17

## 2020-02-07 NOTE — PLAN OF CARE
Alert and oriented x4. VSS. Afebrile. No c/o of pain or SOB. LLE is swollen and red with multiple weeping blisters. Dressing changed at 10pm and 6am. IV in R forearm was leaking so new IV in L forearm. No fluids running. IV ancef and clindamycin.  Blood bridges

## 2020-02-07 NOTE — PROGRESS NOTES
[facility]  Progress Note     Yvan Hyatt Patient Status:  Inpatient    1975 MRN [id number]   [facility] 4NW-A Attending Noelle Sidhu MD   Hosp Day # 6 PCP No primary care provider on file.      Chief Complaint: L leg c not displayed. Estimated Creatinine Clearance: 98.9 mL/min (based on SCr of 0.62 mg/dL). No results for input(s): PTP, INR in the last 168 hours. Recent Labs   Lab 02/05/20  0612   CK 23*            Imaging: Imaging data reviewed in Epic.     Me

## 2020-02-07 NOTE — PROGRESS NOTES
[facility]                INFECTIOUS DISEASE PROGRESS NOTE    Luis A Paz Patient Status:  Inpatient    1975 MRN [id number]   [facility] 4NW-A Attending Kvng Machado MD   Hosp Day # 6 PCP No primary care provider on values in this interval not displayed. No results found for: [facility] Encounter on 02/01/20   1.  BLOOD CULTURE     Status: None    Collection Time: 02/01/20  2:49 PM   Result Value Ref Range    Blood Culture Result No Growth 5

## 2020-02-07 NOTE — OCCUPATIONAL THERAPY NOTE
OCCUPATIONAL THERAPY EVALUATION - INPATIENT     Room Number: 407/407-A  Evaluation Date: 2/7/2020  Type of Evaluation: Initial  Presenting Problem: LLE cellulitis    Physician Order: IP Consult to Occupational Therapy  Reason for Therapy: ADL/IADL Dysfunct TESTS                                    NEUROLOGICAL FINDINGS                   ACTIVITY TOLERANCE                         O2 SATURATIONS                ACTIVITIES OF DAILY LIVING ASSESSMENT  AM-PAC ‘6-Clicks’ Inpatient Daily Activity Short Form  How much demonstrating a  50% degree impairment in activities of daily living. In this OT evaluation patient presents with the following performance deficits: pain management, strength, posture, endurance, functional reach, use of adaptive techniques.  These de from sit to supine:  with modified independent  Patient will transfer from supine to sit:  with modified independent  Patient will transfer from sit to stand:  with modified independent  Patient will transfer to toilet:  with modified independent    UE Exe

## 2020-02-07 NOTE — PLAN OF CARE
Pt is Aox4 on room air and denies SOB when pt sleep pt does desat pt aware she needs Sleep study done. Will give 2l o2 while sleeping and pt will increase to the 90's. Pt state she is only in pain when moving on the left leg.  Left leg dressing was changed ordered activity level and limitations with patient/family  Outcome: Progressing  Goal: Maintain proper alignment of affected body part  Description  INTERVENTIONS:  - Support and protect limb and body alignment per provider's orders  - Instruct and reinfo assist with strengthening/mobility  - Encourage toileting schedule  Outcome: Progressing     Problem: Impaired Functional Mobility  Goal: Achieve highest/safest level of mobility/gait  Description  Interventions:  - Assess patient's functional ability and

## 2020-02-07 NOTE — PHYSICAL THERAPY NOTE
PHYSICAL THERAPY TREATMENT NOTE - INPATIENT    Room Number: 407/407-A     Session: 1   Number of Visits to Meet Established Goals: 3     History related to current admission: Pt was admitted from home on 2/1/2020 with LLE cellulitis.  Pt has a past medical Degree of Impairment: 50.57%   Standardized Score (AM-PAC Scale): 42.13   CMS Modifier (G-Code): CK    FUNCTIONAL ABILITY STATUS  Gait Assessment   Gait Assistance: Supervision  Distance (ft): 50  Assistive Device: Rolling walker  Pattern: Shuffle stairs with supervision to ensure safety at VT.      Goal #3 Patient is able to ambulate 150 feet with assist device: least restrictive device at assistance level: supervision      Goal #4     Goal #5     Goal #6     Goal Comments: Goals established on 2/6/

## 2020-02-07 NOTE — CM/SW NOTE
02/07/20 1413   Discharge disposition   Expected discharge disposition Skilled Nurs   Name of [address] [facility]   Discharge transportation [facility]   BLS requested for 6:00pm. Scheduled pt a wound care clinic appointment a

## 2020-02-07 NOTE — PLAN OF CARE
Pt is aox 4 on room air and tele and pt has NSR via tele. PT states pain during dressing change and was given tramdol once. Pt wounds were changed at 8 am and 5 pm. Pt Vitals have been stable and afebrile during shift.  Pt glucose levels were good no covera appropriate assistive device and precautions (e.g. spinal or hip dislocation precautions)  Outcome: Progressing     Problem: PAIN - ADULT  Goal: Verbalizes/displays adequate comfort level or patient's stated pain goal  Description  INTERVENTIONS:  - Encour activity/tolerance for mobility and gait  - Educate and engage patient/family in tolerated activity level and precautions  - Up to chair x3 daily   Outcome: Progressing

## 2020-02-07 NOTE — DIETARY NOTE
[facility]    NUTRITION INITIAL ASSESSMENT    Pt does not meet malnutrition criteria. NUTRITION DIAGNOSIS/PROBLEM:    Food and nutrition-related knowledge deficit related to no previous diet ed as evidenced by new DM dx.     Altered nutrition relate FINDINGS:     1. Body Fat/Muscle Mass: No evident signs of fat/muscle wasting per inspection.      NUTRITION PRESCRIPTION:184.9 kg  Calories: 7606-3236 calories/day (11-13 calories per kg)- to promote wt loss  Protein: 109-136 grams protein/day (2.0-2.5 gra

## 2020-02-07 NOTE — PAYOR COMM NOTE
--------------  CONTINUED STAY REVIEW    Payor: [facility] PPO  Subscriber #:  [id number]  Authorization Number: [id number]    Admit date: 2/1/20  Admit time: 79[facility][1]    Admitting Physician: Jonas Van MD  Attending Physician:  Margoth Jalloh MD  Primary Car

## 2020-02-08 NOTE — PLAN OF CARE
NURSING DISCHARGE NOTE    Discharged Rehab facility via Ambulance. Accompanied by Support staff  Belongings Taken by patient/family. Reviewed avs with pt, pt very drowsy. Called in report to Nurse Shanti Blount. EMS took pt  And belongings .

## 2020-02-08 NOTE — DISCHARGE SUMMARY
[facility]  DISCHARGE SUMMARY     Batsheva Hilton Patient Status:  Inpatient    1975 MRN [id number]   [facility] 4NW-A Attending No att. providers found   Hosp Day # 6 PCP No primary care provider on file.      Date of Admission disease    Discharge Medication List:     Discharge Medications      START taking these medications      Instructions Prescription details   ceFAZolin sodium 2 GM/20ML Sosy  Commonly known as:  ANCEF/KEFZOL      Inject 20 mL (2 g total) into the vein every signs:   Temp:  [98 °F (36.7 °C)-98.3 °F (36.8 °C)] 98.3 °F (36.8 °C)  Pulse:  [] 95  Resp:  [16-18] 16  BP: (613-150)/(35-72) 150/67      -----------------------------------------------------------------------------------------------  PATIENT DISCHAR

## 2020-02-10 ENCOUNTER — NURSE ONLY (OUTPATIENT)
Dept: LAB | Age: 45
End: 2020-02-10
Attending: FAMILY MEDICINE
Payer: COMMERCIAL

## 2020-02-10 ENCOUNTER — INITIAL APN SNF VISIT (OUTPATIENT)
Dept: INTERNAL MEDICINE CLINIC | Age: 45
End: 2020-02-10

## 2020-02-10 DIAGNOSIS — E66.2 OBESITY HYPOVENTILATION SYNDROME (HCC): ICD-10-CM

## 2020-02-10 DIAGNOSIS — L03.116 LEFT LEG CELLULITIS: Primary | ICD-10-CM

## 2020-02-10 DIAGNOSIS — R53.1 WEAKNESS: ICD-10-CM

## 2020-02-10 DIAGNOSIS — A40.0 SEPSIS DUE TO GROUP A STREPTOCOCCUS, UNSPECIFIED WHETHER ACUTE ORGAN DYSFUNCTION PRESENT (HCC): ICD-10-CM

## 2020-02-10 DIAGNOSIS — L03.116 CELLULITIS OF LEFT FOOT: Primary | ICD-10-CM

## 2020-02-10 DIAGNOSIS — E11.9 CONTROLLED TYPE 2 DIABETES MELLITUS WITHOUT COMPLICATION, WITHOUT LONG-TERM CURRENT USE OF INSULIN (HCC): ICD-10-CM

## 2020-02-10 DIAGNOSIS — I10 BENIGN ESSENTIAL HTN: ICD-10-CM

## 2020-02-10 LAB
ANION GAP SERPL CALC-SCNC: 4 MMOL/L (ref 0–18)
BASOPHILS # BLD: 0 X10(3) UL (ref 0–0.2)
BASOPHILS NFR BLD: 0 %
BUN BLD-MCNC: 4 MG/DL (ref 7–18)
BUN/CREAT SERPL: 8.5 (ref 10–20)
CALCIUM BLD-MCNC: 8.3 MG/DL (ref 8.5–10.1)
CHLORIDE SERPL-SCNC: 98 MMOL/L (ref 98–112)
CO2 SERPL-SCNC: 33 MMOL/L (ref 21–32)
CREAT BLD-MCNC: 0.47 MG/DL (ref 0.55–1.02)
DEPRECATED RDW RBC AUTO: 59.9 FL (ref 35.1–46.3)
EOSINOPHIL # BLD: 0 X10(3) UL (ref 0–0.7)
EOSINOPHIL NFR BLD: 0 %
ERYTHROCYTE [DISTWIDTH] IN BLOOD BY AUTOMATED COUNT: 17 % (ref 11–15)
GLUCOSE BLD-MCNC: 91 MG/DL (ref 70–99)
HCT VFR BLD AUTO: 37.4 % (ref 35–48)
HGB BLD-MCNC: 11.5 G/DL (ref 12–16)
LYMPHOCYTES NFR BLD: 0.74 X10(3) UL (ref 1–4)
LYMPHOCYTES NFR BLD: 4 %
MCH RBC QN AUTO: 31.2 PG (ref 26–34)
MCHC RBC AUTO-ENTMCNC: 30.7 G/DL (ref 31–37)
MCV RBC AUTO: 101.4 FL (ref 80–100)
MONOCYTES # BLD: 1.67 X10(3) UL (ref 0.1–1)
MONOCYTES NFR BLD: 9 %
NEUTROPHILS # BLD AUTO: 13.5 X10 (3) UL (ref 1.5–7.7)
NEUTROPHILS NFR BLD: 82 %
NEUTS BAND NFR BLD: 5 %
NEUTS HYPERSEG # BLD: 16.18 X10(3) UL (ref 1.5–7.7)
NRBC BLD MANUAL-RTO: 2 %
OSMOLALITY SERPL CALC.SUM OF ELEC: 276 MOSM/KG (ref 275–295)
PATIENT FASTING Y/N/NP: YES
PLATELET # BLD AUTO: 322 10(3)UL (ref 150–450)
PLATELET MORPHOLOGY: NORMAL
POTASSIUM SERPL-SCNC: 5 MMOL/L (ref 3.5–5.1)
RBC # BLD AUTO: 3.69 X10(6)UL (ref 3.8–5.3)
SODIUM SERPL-SCNC: 135 MMOL/L (ref 136–145)
TOTAL CELLS COUNTED: 100
WBC # BLD AUTO: 18.6 X10(3) UL (ref 4–11)

## 2020-02-10 PROCEDURE — 85007 BL SMEAR W/DIFF WBC COUNT: CPT

## 2020-02-10 PROCEDURE — 85027 COMPLETE CBC AUTOMATED: CPT

## 2020-02-10 PROCEDURE — 80048 BASIC METABOLIC PNL TOTAL CA: CPT

## 2020-02-10 PROCEDURE — 99309 SBSQ NF CARE MODERATE MDM 30: CPT | Performed by: NURSE PRACTITIONER

## 2020-02-10 PROCEDURE — 85025 COMPLETE CBC W/AUTO DIFF WBC: CPT

## 2020-02-11 ENCOUNTER — EXTERNAL FACILITY (OUTPATIENT)
Dept: FAMILY MEDICINE CLINIC | Facility: CLINIC | Age: 45
End: 2020-02-11

## 2020-02-11 VITALS
OXYGEN SATURATION: 93 % | SYSTOLIC BLOOD PRESSURE: 135 MMHG | WEIGHT: 293 LBS | BODY MASS INDEX: 68 KG/M2 | TEMPERATURE: 97 F | HEART RATE: 99 BPM | RESPIRATION RATE: 20 BRPM | DIASTOLIC BLOOD PRESSURE: 88 MMHG

## 2020-02-11 DIAGNOSIS — E66.01 MORBID OBESITY WITH BODY MASS INDEX (BMI) OF 60.0 TO 69.9 IN ADULT (HCC): Primary | ICD-10-CM

## 2020-02-11 DIAGNOSIS — I10 BENIGN ESSENTIAL HTN: ICD-10-CM

## 2020-02-11 DIAGNOSIS — G47.34 NOCTURNAL HYPOXIA: ICD-10-CM

## 2020-02-11 DIAGNOSIS — R53.1 GENERALIZED WEAKNESS: ICD-10-CM

## 2020-02-11 DIAGNOSIS — R53.81 PHYSICAL DECONDITIONING: ICD-10-CM

## 2020-02-11 DIAGNOSIS — L03.116 LEFT LEG CELLULITIS: ICD-10-CM

## 2020-02-11 DIAGNOSIS — L03.116 CELLULITIS AND ABSCESS OF LEFT LEG: ICD-10-CM

## 2020-02-11 DIAGNOSIS — L02.416 CELLULITIS AND ABSCESS OF LEFT LEG: ICD-10-CM

## 2020-02-11 DIAGNOSIS — E11.9 TYPE 2 DIABETES MELLITUS WITHOUT COMPLICATION, WITHOUT LONG-TERM CURRENT USE OF INSULIN (HCC): Chronic | ICD-10-CM

## 2020-02-11 DIAGNOSIS — Z91.81 AT HIGH RISK FOR INJURY RELATED TO FALL: ICD-10-CM

## 2020-02-11 DIAGNOSIS — E66.2 OBESITY HYPOVENTILATION SYNDROME (HCC): ICD-10-CM

## 2020-02-11 PROCEDURE — 99306 1ST NF CARE HIGH MDM 50: CPT | Performed by: FAMILY MEDICINE

## 2020-02-11 RX ORDER — DIAPER,BRIEF,INFANT-TODD,DISP
1 EACH MISCELLANEOUS 3 TIMES DAILY
COMMUNITY

## 2020-02-11 RX ORDER — IBUPROFEN 400 MG/1
400 TABLET ORAL EVERY 6 HOURS PRN
COMMUNITY

## 2020-02-11 RX ORDER — ACETAMINOPHEN 325 MG/1
650 TABLET ORAL EVERY 6 HOURS PRN
Status: ON HOLD | COMMUNITY
End: 2020-02-14

## 2020-02-11 RX ORDER — ONDANSETRON 4 MG/1
4 TABLET, ORALLY DISINTEGRATING ORAL EVERY 8 HOURS PRN
COMMUNITY

## 2020-02-11 RX ORDER — TRAMADOL HYDROCHLORIDE 50 MG/1
50 TABLET ORAL EVERY 6 HOURS PRN
Status: ON HOLD | COMMUNITY
End: 2020-02-14

## 2020-02-11 RX ORDER — DOCUSATE SODIUM 100 MG/1
200 CAPSULE, LIQUID FILLED ORAL 2 TIMES DAILY
COMMUNITY

## 2020-02-11 NOTE — PROGRESS NOTES
Yamilex Roberson  : 1975  Age 38year old  female patient is admitted to Facility: The [address] [place] [address] for [facility] 12.     [address] [place] [address] Drive date:  2020  Discharge date to [facility]:  2020  ELOS:  12-14 days pending IV abx  Anticipated times a week       ALLERGIES:  No Known Allergies    POA none    CODE STATUS:  Full Code    ADVANCED CARE PLANNING TEAM: None      CURRENT MEDICATIONS   Current Outpatient Medications   Medication Sig Dispense Refill   • traMADol HCl 50 MG Oral Tab Take 50 anxiety  HEMATOLOGY:denies hx anemia, denies bruising, denies excessive bleeding  ENDOCRINE: denies excessive thirst or urination; denies unexpected wt gain or wt loss  ALLERGY/IMM.: denies food or seasonal allergies      PHYSICAL EXAM:  GENERAL HEALTH: we 124 (H) 02/01/2020    AST 18 02/01/2020    ALT 24 02/01/2020    BILT 0.5 02/01/2020    TP 8.4 (H) 02/01/2020    ALB 2.8 (L) 02/01/2020    GLOBULIN 5.6 (H) 02/01/2020     (L) 02/10/2020    K 5.0 02/10/2020    CL 98 02/10/2020    CO2 33.0 (H) 02/10/202

## 2020-02-12 ENCOUNTER — SNF VISIT (OUTPATIENT)
Dept: INTERNAL MEDICINE CLINIC | Age: 45
End: 2020-02-12

## 2020-02-12 DIAGNOSIS — R53.1 WEAKNESS: ICD-10-CM

## 2020-02-12 DIAGNOSIS — L03.116 LEFT LEG CELLULITIS: Primary | ICD-10-CM

## 2020-02-12 DIAGNOSIS — E66.2 OBESITY HYPOVENTILATION SYNDROME (HCC): ICD-10-CM

## 2020-02-12 DIAGNOSIS — E11.9 CONTROLLED TYPE 2 DIABETES MELLITUS WITHOUT COMPLICATION, WITHOUT LONG-TERM CURRENT USE OF INSULIN (HCC): ICD-10-CM

## 2020-02-12 DIAGNOSIS — I10 BENIGN ESSENTIAL HTN: ICD-10-CM

## 2020-02-12 PROCEDURE — 99308 SBSQ NF CARE LOW MDM 20: CPT | Performed by: NURSE PRACTITIONER

## 2020-02-13 ENCOUNTER — NURSE ONLY (OUTPATIENT)
Dept: LAB | Age: 45
End: 2020-02-13
Attending: NURSE PRACTITIONER
Payer: COMMERCIAL

## 2020-02-13 ENCOUNTER — HOSPITAL ENCOUNTER (INPATIENT)
Facility: HOSPITAL | Age: 45
LOS: 4 days | Discharge: NURSING, REHAB OR LONG-TERM CARE | DRG: 580 | End: 2020-02-17
Attending: INTERNAL MEDICINE | Admitting: INTERNAL MEDICINE
Payer: COMMERCIAL

## 2020-02-13 ENCOUNTER — OFFICE VISIT (OUTPATIENT)
Dept: WOUND CARE | Facility: HOSPITAL | Age: 45
End: 2020-02-13
Attending: NURSE PRACTITIONER
Payer: COMMERCIAL

## 2020-02-13 ENCOUNTER — ORDERS FOR ADMISSION (OUTPATIENT)
Dept: INFECTIOUS DISEASE | Facility: CLINIC | Age: 45
End: 2020-02-13

## 2020-02-13 VITALS
OXYGEN SATURATION: 93 % | HEART RATE: 100 BPM | BODY MASS INDEX: 66 KG/M2 | RESPIRATION RATE: 19 BRPM | WEIGHT: 293 LBS | SYSTOLIC BLOOD PRESSURE: 125 MMHG | TEMPERATURE: 97 F | DIASTOLIC BLOOD PRESSURE: 73 MMHG

## 2020-02-13 DIAGNOSIS — L03.116 CELLULITIS OF LEFT FOOT: Primary | ICD-10-CM

## 2020-02-13 DIAGNOSIS — L02.91 ABSCESS: ICD-10-CM

## 2020-02-13 DIAGNOSIS — L03.116 LEFT LEG CELLULITIS: Primary | ICD-10-CM

## 2020-02-13 DIAGNOSIS — E66.2 OBESITY HYPOVENTILATION SYNDROME (HCC): ICD-10-CM

## 2020-02-13 DIAGNOSIS — M79.A22 NONTRAUMATIC COMPARTMENT SYNDROME OF LEFT LOWER EXTREMITY: ICD-10-CM

## 2020-02-13 DIAGNOSIS — G47.34 NOCTURNAL HYPOXIA: Primary | ICD-10-CM

## 2020-02-13 DIAGNOSIS — R06.00 NOCTURNAL DYSPNEA: ICD-10-CM

## 2020-02-13 DIAGNOSIS — E11.628 TYPE 2 DIABETES MELLITUS WITH SKIN COMPLICATION (HCC): ICD-10-CM

## 2020-02-13 PROBLEM — L02.416 CELLULITIS AND ABSCESS OF LEFT LEG: Status: ACTIVE | Noted: 2020-02-13

## 2020-02-13 LAB
ALBUMIN SERPL-MCNC: 1.8 G/DL (ref 3.4–5)
ALBUMIN/GLOB SERPL: 0.3 {RATIO} (ref 1–2)
ALP LIVER SERPL-CCNC: 69 U/L (ref 37–98)
ALT SERPL-CCNC: 28 U/L (ref 13–56)
ANION GAP SERPL CALC-SCNC: 4 MMOL/L (ref 0–18)
AST SERPL-CCNC: 21 U/L (ref 15–37)
BASOPHILS # BLD AUTO: 0.05 X10(3) UL (ref 0–0.2)
BASOPHILS NFR BLD AUTO: 0.5 %
BILIRUB SERPL-MCNC: 0.4 MG/DL (ref 0.1–2)
BUN BLD-MCNC: 7 MG/DL (ref 7–18)
BUN/CREAT SERPL: 11.3 (ref 10–20)
CALCIUM BLD-MCNC: 8.5 MG/DL (ref 8.5–10.1)
CHLORIDE SERPL-SCNC: 100 MMOL/L (ref 98–112)
CO2 SERPL-SCNC: 31 MMOL/L (ref 21–32)
CREAT BLD-MCNC: 0.62 MG/DL (ref 0.55–1.02)
DEPRECATED RDW RBC AUTO: 60.4 FL (ref 35.1–46.3)
EOSINOPHIL # BLD AUTO: 0.17 X10(3) UL (ref 0–0.7)
EOSINOPHIL NFR BLD AUTO: 1.5 %
ERYTHROCYTE [DISTWIDTH] IN BLOOD BY AUTOMATED COUNT: 16.7 % (ref 11–15)
GLOBULIN PLAS-MCNC: 6.1 G/DL (ref 2.8–4.4)
GLUCOSE BLD-MCNC: 108 MG/DL (ref 70–99)
GLUCOSE BLD-MCNC: 109 MG/DL (ref 70–99)
GLUCOSE BLD-MCNC: 88 MG/DL (ref 70–99)
GLUCOSE BLD-MCNC: 99 MG/DL (ref 70–99)
HCT VFR BLD AUTO: 36.7 % (ref 35–48)
HGB BLD-MCNC: 11.4 G/DL (ref 12–16)
IMM GRANULOCYTES # BLD AUTO: 0.21 X10(3) UL (ref 0–1)
IMM GRANULOCYTES NFR BLD: 1.9 %
LYMPHOCYTES # BLD AUTO: 2.43 X10(3) UL (ref 1–4)
LYMPHOCYTES NFR BLD AUTO: 21.9 %
M PROTEIN MFR SERPL ELPH: 7.9 G/DL (ref 6.4–8.2)
MCH RBC QN AUTO: 31.1 PG (ref 26–34)
MCHC RBC AUTO-ENTMCNC: 31.1 G/DL (ref 31–37)
MCV RBC AUTO: 100.3 FL (ref 80–100)
MONOCYTES # BLD AUTO: 0.68 X10(3) UL (ref 0.1–1)
MONOCYTES NFR BLD AUTO: 6.1 %
NEUTROPHILS # BLD AUTO: 7.57 X10 (3) UL (ref 1.5–7.7)
NEUTROPHILS # BLD AUTO: 7.57 X10(3) UL (ref 1.5–7.7)
NEUTROPHILS NFR BLD AUTO: 68.1 %
OSMOLALITY SERPL CALC.SUM OF ELEC: 277 MOSM/KG (ref 275–295)
PATIENT FASTING Y/N/NP: YES
PLATELET # BLD AUTO: 355 10(3)UL (ref 150–450)
POTASSIUM SERPL-SCNC: 4.2 MMOL/L (ref 3.5–5.1)
RBC # BLD AUTO: 3.66 X10(6)UL (ref 3.8–5.3)
SODIUM SERPL-SCNC: 135 MMOL/L (ref 136–145)
WBC # BLD AUTO: 11.1 X10(3) UL (ref 4–11)

## 2020-02-13 PROCEDURE — 99223 1ST HOSP IP/OBS HIGH 75: CPT | Performed by: INTERNAL MEDICINE

## 2020-02-13 PROCEDURE — 82962 GLUCOSE BLOOD TEST: CPT

## 2020-02-13 PROCEDURE — 85025 COMPLETE CBC W/AUTO DIFF WBC: CPT

## 2020-02-13 PROCEDURE — 99215 OFFICE O/P EST HI 40 MIN: CPT

## 2020-02-13 PROCEDURE — 80053 COMPREHEN METABOLIC PANEL: CPT

## 2020-02-13 RX ORDER — HEPARIN SODIUM 5000 [USP'U]/ML
7500 INJECTION, SOLUTION INTRAVENOUS; SUBCUTANEOUS EVERY 12 HOURS SCHEDULED
Status: DISCONTINUED | OUTPATIENT
Start: 2020-02-13 | End: 2020-02-17

## 2020-02-13 RX ORDER — DOCUSATE SODIUM 100 MG/1
200 CAPSULE, LIQUID FILLED ORAL 2 TIMES DAILY
Status: DISCONTINUED | OUTPATIENT
Start: 2020-02-13 | End: 2020-02-17

## 2020-02-13 RX ORDER — ONDANSETRON 2 MG/ML
4 INJECTION INTRAMUSCULAR; INTRAVENOUS EVERY 6 HOURS PRN
Status: DISCONTINUED | OUTPATIENT
Start: 2020-02-13 | End: 2020-02-17

## 2020-02-13 RX ORDER — HYDROCODONE BITARTRATE AND ACETAMINOPHEN 5; 325 MG/1; MG/1
2 TABLET ORAL EVERY 4 HOURS PRN
Status: DISCONTINUED | OUTPATIENT
Start: 2020-02-13 | End: 2020-02-17

## 2020-02-13 RX ORDER — ACETAMINOPHEN 325 MG/1
650 TABLET ORAL EVERY 6 HOURS PRN
Status: DISCONTINUED | OUTPATIENT
Start: 2020-02-13 | End: 2020-02-17

## 2020-02-13 RX ORDER — ONDANSETRON 4 MG/1
4 TABLET, ORALLY DISINTEGRATING ORAL EVERY 8 HOURS PRN
Status: DISCONTINUED | OUTPATIENT
Start: 2020-02-13 | End: 2020-02-17

## 2020-02-13 RX ORDER — CEFAZOLIN SODIUM/WATER 2 G/20 ML
2 SYRINGE (ML) INTRAVENOUS EVERY 8 HOURS
Status: DISCONTINUED | OUTPATIENT
Start: 2020-02-13 | End: 2020-02-14

## 2020-02-13 RX ORDER — ACETAMINOPHEN 325 MG/1
650 TABLET ORAL EVERY 4 HOURS PRN
Status: DISCONTINUED | OUTPATIENT
Start: 2020-02-13 | End: 2020-02-17

## 2020-02-13 RX ORDER — MAGNESIUM OXIDE 400 MG (241.3 MG MAGNESIUM) TABLET
1 TABLET NIGHTLY
Status: DISCONTINUED | OUTPATIENT
Start: 2020-02-13 | End: 2020-02-17

## 2020-02-13 RX ORDER — TRAMADOL HYDROCHLORIDE 50 MG/1
50 TABLET ORAL EVERY 6 HOURS PRN
Status: DISCONTINUED | OUTPATIENT
Start: 2020-02-13 | End: 2020-02-17

## 2020-02-13 RX ORDER — DEXTROSE MONOHYDRATE 25 G/50ML
50 INJECTION, SOLUTION INTRAVENOUS
Status: DISCONTINUED | OUTPATIENT
Start: 2020-02-13 | End: 2020-02-17

## 2020-02-13 RX ORDER — SODIUM CHLORIDE 9 MG/ML
INJECTION, SOLUTION INTRAVENOUS CONTINUOUS
Status: DISCONTINUED | OUTPATIENT
Start: 2020-02-13 | End: 2020-02-17

## 2020-02-13 RX ORDER — HYDROCODONE BITARTRATE AND ACETAMINOPHEN 5; 325 MG/1; MG/1
1 TABLET ORAL EVERY 4 HOURS PRN
Status: DISCONTINUED | OUTPATIENT
Start: 2020-02-13 | End: 2020-02-17

## 2020-02-13 NOTE — PROGRESS NOTES
Stephane Bates, 95/1975, 38year old, female    Chief Complaint:  Patient presents with:   Follow - Up: cellulitis, Sepsis, Obesity, new DM  Lab Results  Weakness       Subjective:    Patient is a 38year old female with no previous medical history and n wheezing, no dyspnea, no cough, room air  CARDIOVASCULAR: RRR, S1 and S2, no murmurs, no edema  ABDOMEN: Obese,  normal active BS+, soft, nondistended; no organomegaly, no masses; nontender, no guarding, no rebound tenderness.   :Deferred  LYMPHATIC:no ly diet  - dietician to see  - will need outpt follow up       Rash on back  - hydrocortisone cream   - improving    Pain management  - Tylenol prn  - ibuprofen prn  - tramadol prn     DVT prophylaxis  Heparin SQ q 8     GI prophylaxis  None     Labs  CBC, BM

## 2020-02-13 NOTE — PLAN OF CARE
ADMITTED DIRECT FROM WOUND CLINIC THIS PM TO SEE  [address] PRIOR TO ADMISSION LIVES HOME. FEB  1ADMITTED TO 4TH FLOOR  STAY FOR IV ABT UNTIL Saturday FEB 8. SENT TO [facility] FOR P.T. TODAY FOR WOUND APPOINTMENT AND READMITTED.  WOUND RN CLEAN AND WRAP WITH KER

## 2020-02-13 NOTE — PROGRESS NOTES
[facility]                INFECTIOUS DISEASE PROGRESS NOTE    Yvan Hyatt Patient Status:  Inpatient    1975 MRN [id number]   [facility] 4NW-A Attending Noelle Sidhu MD   Hosp Day # 0 PCP No primary care provider on

## 2020-02-14 ENCOUNTER — ANESTHESIA EVENT (OUTPATIENT)
Dept: SURGERY | Facility: HOSPITAL | Age: 45
DRG: 580 | End: 2020-02-14
Payer: COMMERCIAL

## 2020-02-14 ENCOUNTER — ANESTHESIA (OUTPATIENT)
Dept: SURGERY | Facility: HOSPITAL | Age: 45
DRG: 580 | End: 2020-02-14
Payer: COMMERCIAL

## 2020-02-14 PROBLEM — E11.9 DIABETES (HCC): Chronic | Status: ACTIVE | Noted: 2020-02-14

## 2020-02-14 PROBLEM — E66.01 MORBID OBESITY WITH BODY MASS INDEX (BMI) OF 60.0 TO 69.9 IN ADULT (HCC): Status: ACTIVE | Noted: 2020-02-14

## 2020-02-14 LAB
ALBUMIN SERPL-MCNC: 2.1 G/DL (ref 3.4–5)
ALBUMIN/GLOB SERPL: 0.3 {RATIO} (ref 1–2)
ALP LIVER SERPL-CCNC: 78 U/L (ref 37–98)
ALT SERPL-CCNC: 17 U/L (ref 13–56)
ANION GAP SERPL CALC-SCNC: 4 MMOL/L (ref 0–18)
ANION GAP SERPL CALC-SCNC: 6 MMOL/L (ref 0–18)
AST SERPL-CCNC: 21 U/L (ref 15–37)
BASOPHILS # BLD AUTO: 0.05 X10(3) UL (ref 0–0.2)
BASOPHILS # BLD AUTO: 0.07 X10(3) UL (ref 0–0.2)
BASOPHILS NFR BLD AUTO: 0.4 %
BASOPHILS NFR BLD AUTO: 0.6 %
BILIRUB SERPL-MCNC: 0.4 MG/DL (ref 0.1–2)
BUN BLD-MCNC: 10 MG/DL (ref 7–18)
BUN BLD-MCNC: 11 MG/DL (ref 7–18)
BUN/CREAT SERPL: 13.2 (ref 10–20)
BUN/CREAT SERPL: 14.5 (ref 10–20)
CALCIUM BLD-MCNC: 8.4 MG/DL (ref 8.5–10.1)
CALCIUM BLD-MCNC: 8.9 MG/DL (ref 8.5–10.1)
CHLORIDE SERPL-SCNC: 101 MMOL/L (ref 98–112)
CHLORIDE SERPL-SCNC: 99 MMOL/L (ref 98–112)
CO2 SERPL-SCNC: 29 MMOL/L (ref 21–32)
CO2 SERPL-SCNC: 30 MMOL/L (ref 21–32)
CREAT BLD-MCNC: 0.76 MG/DL (ref 0.55–1.02)
CREAT BLD-MCNC: 0.76 MG/DL (ref 0.55–1.02)
DEPRECATED RDW RBC AUTO: 60.9 FL (ref 35.1–46.3)
DEPRECATED RDW RBC AUTO: 61.7 FL (ref 35.1–46.3)
EOSINOPHIL # BLD AUTO: 0.14 X10(3) UL (ref 0–0.7)
EOSINOPHIL # BLD AUTO: 0.16 X10(3) UL (ref 0–0.7)
EOSINOPHIL NFR BLD AUTO: 1.2 %
EOSINOPHIL NFR BLD AUTO: 1.4 %
ERYTHROCYTE [DISTWIDTH] IN BLOOD BY AUTOMATED COUNT: 16.8 % (ref 11–15)
ERYTHROCYTE [DISTWIDTH] IN BLOOD BY AUTOMATED COUNT: 16.9 % (ref 11–15)
GLOBULIN PLAS-MCNC: 6.7 G/DL (ref 2.8–4.4)
GLUCOSE BLD-MCNC: 101 MG/DL (ref 70–99)
GLUCOSE BLD-MCNC: 106 MG/DL (ref 70–99)
GLUCOSE BLD-MCNC: 109 MG/DL (ref 70–99)
GLUCOSE BLD-MCNC: 115 MG/DL (ref 70–99)
GLUCOSE BLD-MCNC: 132 MG/DL (ref 70–99)
GLUCOSE BLD-MCNC: 83 MG/DL (ref 70–99)
HAV IGM SER QL: 2.4 MG/DL (ref 1.6–2.6)
HCG URINE QUALITATIVE: NEGATIVE
HCT VFR BLD AUTO: 38.1 % (ref 35–48)
HCT VFR BLD AUTO: 39.4 % (ref 35–48)
HGB BLD-MCNC: 11.7 G/DL (ref 12–16)
HGB BLD-MCNC: 12.1 G/DL (ref 12–16)
IMM GRANULOCYTES # BLD AUTO: 0.18 X10(3) UL (ref 0–1)
IMM GRANULOCYTES # BLD AUTO: 0.2 X10(3) UL (ref 0–1)
IMM GRANULOCYTES NFR BLD: 1.6 %
IMM GRANULOCYTES NFR BLD: 1.7 %
INR BLD: 1.03 (ref 0.9–1.1)
LYMPHOCYTES # BLD AUTO: 2.41 X10(3) UL (ref 1–4)
LYMPHOCYTES # BLD AUTO: 2.56 X10(3) UL (ref 1–4)
LYMPHOCYTES NFR BLD AUTO: 21.7 %
LYMPHOCYTES NFR BLD AUTO: 22.3 %
M PROTEIN MFR SERPL ELPH: 8.8 G/DL (ref 6.4–8.2)
MCH RBC QN AUTO: 31 PG (ref 26–34)
MCH RBC QN AUTO: 31.1 PG (ref 26–34)
MCHC RBC AUTO-ENTMCNC: 30.7 G/DL (ref 31–37)
MCHC RBC AUTO-ENTMCNC: 30.7 G/DL (ref 31–37)
MCV RBC AUTO: 100.8 FL (ref 80–100)
MCV RBC AUTO: 101.3 FL (ref 80–100)
MONOCYTES # BLD AUTO: 0.83 X10(3) UL (ref 0.1–1)
MONOCYTES # BLD AUTO: 0.84 X10(3) UL (ref 0.1–1)
MONOCYTES NFR BLD AUTO: 7.3 %
MONOCYTES NFR BLD AUTO: 7.5 %
NEUTROPHILS # BLD AUTO: 7.44 X10 (3) UL (ref 1.5–7.7)
NEUTROPHILS # BLD AUTO: 7.44 X10(3) UL (ref 1.5–7.7)
NEUTROPHILS # BLD AUTO: 7.68 X10 (3) UL (ref 1.5–7.7)
NEUTROPHILS # BLD AUTO: 7.68 X10(3) UL (ref 1.5–7.7)
NEUTROPHILS NFR BLD AUTO: 67.1 %
NEUTROPHILS NFR BLD AUTO: 67.2 %
OSMOLALITY SERPL CALC.SUM OF ELEC: 279 MOSM/KG (ref 275–295)
OSMOLALITY SERPL CALC.SUM OF ELEC: 280 MOSM/KG (ref 275–295)
PLATELET # BLD AUTO: 348 10(3)UL (ref 150–450)
PLATELET # BLD AUTO: 357 10(3)UL (ref 150–450)
POTASSIUM SERPL-SCNC: 3.9 MMOL/L (ref 3.5–5.1)
POTASSIUM SERPL-SCNC: 4.2 MMOL/L (ref 3.5–5.1)
PSA SERPL DL<=0.01 NG/ML-MCNC: 13.9 SECONDS (ref 12.5–14.7)
RBC # BLD AUTO: 3.78 X10(6)UL (ref 3.8–5.3)
RBC # BLD AUTO: 3.89 X10(6)UL (ref 3.8–5.3)
SODIUM SERPL-SCNC: 134 MMOL/L (ref 136–145)
SODIUM SERPL-SCNC: 135 MMOL/L (ref 136–145)
WBC # BLD AUTO: 11.1 X10(3) UL (ref 4–11)
WBC # BLD AUTO: 11.5 X10(3) UL (ref 4–11)

## 2020-02-14 PROCEDURE — 0HDNXZZ EXTRACTION OF LEFT FOOT SKIN, EXTERNAL APPROACH: ICD-10-PCS | Performed by: ORTHOPAEDIC SURGERY

## 2020-02-14 PROCEDURE — 3E0T3BZ INTRODUCTION OF ANESTHETIC AGENT INTO PERIPHERAL NERVES AND PLEXI, PERCUTANEOUS APPROACH: ICD-10-PCS | Performed by: ANESTHESIOLOGY

## 2020-02-14 PROCEDURE — 99232 SBSQ HOSP IP/OBS MODERATE 35: CPT | Performed by: HOSPITALIST

## 2020-02-14 PROCEDURE — 0J9R0ZZ DRAINAGE OF LEFT FOOT SUBCUTANEOUS TISSUE AND FASCIA, OPEN APPROACH: ICD-10-PCS | Performed by: ORTHOPAEDIC SURGERY

## 2020-02-14 RX ORDER — DOCUSATE SODIUM 100 MG/1
100 CAPSULE, LIQUID FILLED ORAL 2 TIMES DAILY
Status: DISCONTINUED | OUTPATIENT
Start: 2020-02-14 | End: 2020-02-17

## 2020-02-14 RX ORDER — MORPHINE SULFATE 4 MG/ML
2 INJECTION, SOLUTION INTRAMUSCULAR; INTRAVENOUS EVERY 2 HOUR PRN
Status: DISCONTINUED | OUTPATIENT
Start: 2020-02-14 | End: 2020-02-17

## 2020-02-14 RX ORDER — CEFAZOLIN SODIUM/WATER 2 G/20 ML
2 SYRINGE (ML) INTRAVENOUS EVERY 6 HOURS
Status: DISCONTINUED | OUTPATIENT
Start: 2020-02-14 | End: 2020-02-17

## 2020-02-14 RX ORDER — SODIUM PHOSPHATE, DIBASIC AND SODIUM PHOSPHATE, MONOBASIC 7; 19 G/133ML; G/133ML
1 ENEMA RECTAL ONCE AS NEEDED
Status: DISCONTINUED | OUTPATIENT
Start: 2020-02-14 | End: 2020-02-17

## 2020-02-14 RX ORDER — MORPHINE SULFATE 4 MG/ML
4 INJECTION, SOLUTION INTRAMUSCULAR; INTRAVENOUS EVERY 2 HOUR PRN
Status: DISCONTINUED | OUTPATIENT
Start: 2020-02-14 | End: 2020-02-17

## 2020-02-14 RX ORDER — MEPERIDINE HYDROCHLORIDE 25 MG/ML
25 INJECTION INTRAMUSCULAR; INTRAVENOUS; SUBCUTANEOUS
Status: DISCONTINUED | OUTPATIENT
Start: 2020-02-14 | End: 2020-02-14 | Stop reason: HOSPADM

## 2020-02-14 RX ORDER — SODIUM CHLORIDE, SODIUM LACTATE, POTASSIUM CHLORIDE, CALCIUM CHLORIDE 600; 310; 30; 20 MG/100ML; MG/100ML; MG/100ML; MG/100ML
INJECTION, SOLUTION INTRAVENOUS CONTINUOUS
Status: DISCONTINUED | OUTPATIENT
Start: 2020-02-14 | End: 2020-02-14 | Stop reason: HOSPADM

## 2020-02-14 RX ORDER — ONDANSETRON 2 MG/ML
4 INJECTION INTRAMUSCULAR; INTRAVENOUS AS NEEDED
Status: DISCONTINUED | OUTPATIENT
Start: 2020-02-14 | End: 2020-02-14 | Stop reason: HOSPADM

## 2020-02-14 RX ORDER — NALOXONE HYDROCHLORIDE 0.4 MG/ML
80 INJECTION, SOLUTION INTRAMUSCULAR; INTRAVENOUS; SUBCUTANEOUS AS NEEDED
Status: DISCONTINUED | OUTPATIENT
Start: 2020-02-14 | End: 2020-02-14 | Stop reason: HOSPADM

## 2020-02-14 RX ORDER — POLYETHYLENE GLYCOL 3350 17 G/17G
17 POWDER, FOR SOLUTION ORAL DAILY PRN
Status: DISCONTINUED | OUTPATIENT
Start: 2020-02-14 | End: 2020-02-17

## 2020-02-14 RX ORDER — SODIUM CHLORIDE, SODIUM LACTATE, POTASSIUM CHLORIDE, CALCIUM CHLORIDE 600; 310; 30; 20 MG/100ML; MG/100ML; MG/100ML; MG/100ML
INJECTION, SOLUTION INTRAVENOUS CONTINUOUS PRN
Status: DISCONTINUED | OUTPATIENT
Start: 2020-02-14 | End: 2020-02-14 | Stop reason: SURG

## 2020-02-14 RX ORDER — HYDROMORPHONE HYDROCHLORIDE 1 MG/ML
0.5 INJECTION, SOLUTION INTRAMUSCULAR; INTRAVENOUS; SUBCUTANEOUS EVERY 5 MIN PRN
Status: DISCONTINUED | OUTPATIENT
Start: 2020-02-14 | End: 2020-02-14 | Stop reason: HOSPADM

## 2020-02-14 RX ORDER — MIDAZOLAM HYDROCHLORIDE 1 MG/ML
INJECTION INTRAMUSCULAR; INTRAVENOUS AS NEEDED
Status: DISCONTINUED | OUTPATIENT
Start: 2020-02-14 | End: 2020-02-14 | Stop reason: SURG

## 2020-02-14 RX ORDER — DEXTROSE MONOHYDRATE 25 G/50ML
50 INJECTION, SOLUTION INTRAVENOUS
Status: DISCONTINUED | OUTPATIENT
Start: 2020-02-14 | End: 2020-02-14 | Stop reason: HOSPADM

## 2020-02-14 RX ORDER — BISACODYL 10 MG
10 SUPPOSITORY, RECTAL RECTAL
Status: DISCONTINUED | OUTPATIENT
Start: 2020-02-14 | End: 2020-02-17

## 2020-02-14 RX ORDER — CEFAZOLIN SODIUM/WATER 2 G/20 ML
2 SYRINGE (ML) INTRAVENOUS EVERY 6 HOURS
Status: DISCONTINUED | OUTPATIENT
Start: 2020-02-14 | End: 2020-02-14

## 2020-02-14 RX ORDER — TRAMADOL HYDROCHLORIDE 50 MG/1
50 TABLET ORAL EVERY 6 HOURS PRN
Qty: 20 TABLET | Refills: 0 | Status: SHIPPED | OUTPATIENT
Start: 2020-02-14

## 2020-02-14 RX ORDER — CEFAZOLIN SODIUM/WATER 2 G/20 ML
2 SYRINGE (ML) INTRAVENOUS EVERY 6 HOURS
Status: DISCONTINUED | OUTPATIENT
Start: 2020-02-15 | End: 2020-02-14

## 2020-02-14 RX ORDER — ONDANSETRON 2 MG/ML
4 INJECTION INTRAMUSCULAR; INTRAVENOUS EVERY 6 HOURS PRN
Status: DISCONTINUED | OUTPATIENT
Start: 2020-02-14 | End: 2020-02-17

## 2020-02-14 RX ORDER — MIDAZOLAM HYDROCHLORIDE 1 MG/ML
1 INJECTION INTRAMUSCULAR; INTRAVENOUS EVERY 5 MIN PRN
Status: DISCONTINUED | OUTPATIENT
Start: 2020-02-14 | End: 2020-02-14 | Stop reason: HOSPADM

## 2020-02-14 RX ORDER — SODIUM CHLORIDE 9 MG/ML
INJECTION, SOLUTION INTRAVENOUS CONTINUOUS
Status: DISCONTINUED | OUTPATIENT
Start: 2020-02-14 | End: 2020-02-17

## 2020-02-14 RX ORDER — MORPHINE SULFATE 4 MG/ML
6 INJECTION, SOLUTION INTRAMUSCULAR; INTRAVENOUS EVERY 2 HOUR PRN
Status: DISCONTINUED | OUTPATIENT
Start: 2020-02-14 | End: 2020-02-17

## 2020-02-14 RX ADMIN — CEFAZOLIN SODIUM/WATER 2 G: 2 G/20 ML SYRINGE (ML) INTRAVENOUS at 15:29:00

## 2020-02-14 RX ADMIN — SODIUM CHLORIDE, SODIUM LACTATE, POTASSIUM CHLORIDE, CALCIUM CHLORIDE: 600; 310; 30; 20 INJECTION, SOLUTION INTRAVENOUS at 15:29:00

## 2020-02-14 RX ADMIN — SODIUM CHLORIDE, SODIUM LACTATE, POTASSIUM CHLORIDE, CALCIUM CHLORIDE: 600; 310; 30; 20 INJECTION, SOLUTION INTRAVENOUS at 16:15:00

## 2020-02-14 RX ADMIN — MIDAZOLAM HYDROCHLORIDE 2 MG: 1 INJECTION INTRAMUSCULAR; INTRAVENOUS at 15:29:00

## 2020-02-14 NOTE — BRIEF OP NOTE
Pre-Operative Diagnosis: Abscess [L02.91]     Post-Operative Diagnosis: Abscess [L02.91]      Procedure Performed:   Procedure(s):  INCISION AND DRAINAGE LEFT ANKLE    Surgeon(s) and Role:     Angie Harper MD - Primary    Assistant(s):        Surgical

## 2020-02-14 NOTE — H&P
[facility]                                                               History & Physical         Raul Saenz Patient Status:  Inpatient    1975 MRN [id number]   [facility] 3SW-A Attending Cata Jackson MD   [facility] Day # 0 , Rfl: , Taking  acetaminophen 325 MG Oral Tab, Take 650 mg by mouth every 6 (six) hours as needed for Pain., Disp: , Rfl: , Taking  ondansetron 4 MG Oral Tablet Dispersible, Take 4 mg by mouth every 8 (eight) hours as needed for Nausea., Disp: , Rfl: , Ta the pictures in the wound RN's note  Psychiatric: Appropriate mood and affect. Diagnostic Data:      Laboratory Data:         Ref.  Range 2/13/2020 07:30   Glucose Latest Ref Range: 70 - 99 mg/dL 88   Sodium Latest Ref Range: 136 - 145 mmol/L 135 (L) Latest Ref Range: 1.00 - 4.00 x10(3) uL 2.43   Monocytes Absolute Latest Ref Range: 0.10 - 1.00 x10(3) uL 0.68     Imaging:  MRI of the left foot and ankle ordered by ID pending    ASSESSMENT / PLAN:     1.  Left leg cellulitis with abscess due to group A s

## 2020-02-14 NOTE — PROGRESS NOTES
[facility]  Progress Note     Adela William Patient Status:  Inpatient    1975 MRN [id number]   [facility] 3SW-A Attending Brandt De Souza [facility] Day # 1 PCP Fariba Mcallister MD     Chief Complaint: LLE wound    S: Alvarez Castrejon Clearance: 80.7 mL/min (based on SCr of 0.76 mg/dL). Recent Labs   Lab 02/14/20  0510   PTP 13.9   INR 1.03       No results for input(s): TROP, CK in the last 168 hours. Imaging: Imaging data reviewed in Epic.     Medications:   • ceFAZolin  2 g

## 2020-02-14 NOTE — PLAN OF CARE
Pain minimal unless leg dependent or bearing weight. MSO4 for pain while NPO. Up with minimal assist to BR with walker, maintains TTWB to LLE when up. Urine set for labs, negative. Consent for I and D of LLE signed and on chart. Plan OR 1330 today.

## 2020-02-14 NOTE — PHYSICAL THERAPY NOTE
PT orders rec'd chart reviewed, attempted. Pt politely refused therapy at this time due to pending left LE I&D this afternoon. Discussed with pct, Rn. Will re attempt following procedure and as able as time allows.

## 2020-02-14 NOTE — CONSULTS
[address]    PATIENT'S NAME: Tori Patricia   ATTENDING PHYSICIAN: Alma Ray D.O.   CONSULTING PHYSICIAN: Cedric Scott M.D.    PATIENT ACCOUNT#:   [de-identified]    LOCATION:  3SW-A [facility]  MEDICAL RECORD #:   [id number]       DATE OF MYAH consultation. On exam, the patient does have fluctuance over her lateral malleolus, and I have recommended incision and drainage procedure. She has a grossly infected left lower extremity. A new MRI scan has been ordered by Dr. Shila Picakrd.   The patient will 07:00:44  [facility] [id number]  [facility]/    cc: Lupe Kay M.D.

## 2020-02-14 NOTE — PLAN OF CARE
Patient on room air desats to 70's when sleeping. O2 2L nc placed on patient but patient took off and refusing to wear. Resp called to assess patient for niranjan.

## 2020-02-14 NOTE — PAYOR COMM NOTE
--------------  ADMISSION REVIEW     Payor: KELSIE WOODY  Subscriber #:  [id number]  Authorization Number: [id number]    Admit date: 2/13/20  Admit time: [address]                                                               History & MG Oral Cap, Take 200 mg by mouth 2 (two) times daily. , Disp: , Rfl: , Taking  hydrocortisone 1 % External Cream, Apply 1 Application topically 3 (three) times daily.  back, Disp: , Rfl: , Taking  ceFAZolin sodium 2 GM/20ML Intravenous Solution Prefilled Sy 109   eGFR AFRICAN AMERICAN Latest Ref Range: >=60  126   ANION GAP Latest Ref Range: 0 - 18 mmol/L 4   CALCULATED OSMOLALITY Latest Ref Range: 275 - 295 mOsm/kg 277   ALKALINE PHOSPHATASE Latest Ref Range: 37 - 98 U/L 69   AST (SGOT) Latest Ref Range: 15 2  1. Hyperglycemia protocol  2.  Follow Accu-Cheks      Quality:  · DVT Prophylaxis: SCD, cutaneous heparin  · CODE status: full  · Calles: no          2/14/20      INFECTIOUS DISEASE PROGRESS NOTE     Antibiotics:#13-cefazolin     Subjective:  Scheduled fo Intravenous       ceFAZolin sodium (ANCEF/KEFZOL) 2 GM/20ML premix IV syringe 2 g     Date Action Dose Route     2/14/2020 0923 Given 2 g Intravenous     2/14/2020 0251 Given 2 g Intravenous       Heparin Sodium (Porcine) 5000 UNIT/ML injection 7,500 Units

## 2020-02-14 NOTE — PLAN OF CARE
Patient refusing cpap. Agreeable to O2 5L nc. Patient sats 90-94% but will dip to 84% when sleeping. Dr Weathers Daily notified of patient refusing cpap.

## 2020-02-14 NOTE — ANESTHESIA PROCEDURE NOTES
[facility]  Performed by: Anthony Rodriguez MD  Authorized by: Anthony Rodriguez MD       General Information and Staff    Start Time:  2/14/2020 3:36 PM  End Time:  2/14/2020 3:40 PM  Anesthesiologist:  Cooper Luciano MD  Performed by:   Anesthesiologist

## 2020-02-14 NOTE — ANESTHESIA PROCEDURE NOTES
Airway  Date/Time: 2/14/2020 3:35 PM  Urgency: elective    Airway not difficult    General Information and Staff    Patient location during procedure: OR  Anesthesiologist: Marcie Rodriguez MD  Performed: anesthesiologist     Indications and Patient Conditi

## 2020-02-14 NOTE — ANESTHESIA PREPROCEDURE EVALUATION
PRE-OP EVALUATION    Patient Name: Frances Power    Pre-op Diagnosis: Abscess [L02.91]    Procedure(s):  INCISION AND DRAINAGE LEFT ANKLE    Surgeon(s) and Role:     Zuri Medrano MD - Primary    Pre-op vitals reviewed.   Temp: 97.7 °F (36.5 °C)  Pul Insulin Aspart Pen (NOVOLOG) 100 UNIT/ML flexpen 1-10 Units, 1-10 Units, Subcutaneous, TID AC and HS  [MAR Hold] ondansetron HCl (ZOFRAN) injection 4 mg, 4 mg, Intravenous, Q6H PRN        Outpatient Medications:  No outpatient medications have been marked Pulmonary    Pulmonary exam normal.                 Other findings            ASA: 3   Plan: general  NPO status verified and patient meets guidelines. Post-procedure pain management plan discussed with surgeon and patient.   Surgeon requests: regional b

## 2020-02-14 NOTE — PROGRESS NOTES
[facility] Pharmacy Note:  Anticoagulation Weight Dose Adjustment for heparin    Alvin Ruff is a 38year old female who has been prescribed heparin 5,000 units every 12 hours. Estimated Creatinine Clearance: 98.9 mL/min (based on SCr of 0.62 mg/dL).  Cristi

## 2020-02-14 NOTE — OCCUPATIONAL THERAPY NOTE
OT orders received, chart reviewed. Attempted to see patient for OT eval this am, however patient politely declining at this time due to LLE I&D scheduled later today. RN aware. Will reattempt as able and as patient appropriate following procedure.

## 2020-02-14 NOTE — ANESTHESIA POSTPROCEDURE EVALUATION
[address] [address] Patient Status:  Inpatient   Age/Gender 38year old female MRN [id number]   [facility] SURGERY Attending INDER Edwards 21 Day # 1 PCP Edvin Rocha MD       Anesthesia Post-op Note    Procedur

## 2020-02-14 NOTE — RESPIRATORY THERAPY NOTE
Called that patient desatting and to assess for [place]. Upon arrival, patient satting 78%, when woken up sats up to 93%. Patient states never had a sleep study before, and does not wear cpap at home.  She does not want to wear one here and already feels the na

## 2020-02-14 NOTE — PROGRESS NOTES
[facility]                INFECTIOUS DISEASE PROGRESS NOTE    Shaggy Downing Patient Status:  Inpatient    1975 MRN [id number]   [facility] 4NW-A Attending Darren Villanueva MD   Hosp Day # 1 PCP MD Jf Louise 60.0 to 69.9 in adult [facility])     Diabetes ([facility] [address]          ASSESSMENT/PLAN:  1. Extensive RLE cellulits/group A strep  -wound cx group A strep, MSSA  Leg wounds improved    2.  Left ankle abscess - had developed since discharge  I/D scheduled today  -await ope

## 2020-02-14 NOTE — CM/SW NOTE
02/14/20 1200   CM/SW Referral Data   Referral Source Patient   Reason for Referral Discharge planning   Informant Patient   Patient Info   Patient's Mental Status Alert;Oriented   Patient's [address] [place]

## 2020-02-14 NOTE — INTERVAL H&P NOTE
Pre-op Diagnosis: Abscess [L02.91]    The above referenced H&P was reviewed by PAUL Rivas on 4/59/1626, the patient was examined and no significant changes have occurred in the patient's condition since the H&P was performed.   I discussed with the

## 2020-02-14 NOTE — PLAN OF CARE
Patient instructed about npo after midnight for surgery today. Patient verbalized understanding. LLE with kerlix dressing intact. Norco given for pain. Left pedal pulse with doppler. Picc to GERALD. Plan of care reviewed. Bad alarm on.  Call light within reach

## 2020-02-14 NOTE — H&P (VIEW-ONLY)
[address] [place]    PATIENT'S NAME: Minor Ferguson   ATTENDING PHYSICIAN: Vishnu Cuenca D.O.   CONSULTING PHYSICIAN: Kia Herndon M.D.    PATIENT ACCOUNT#:   [de-identified]    LOCATION:  [address] [place] [address]  MEDICAL RECORD #:   [id number]       DATE OF MYAH consultation. On exam, the patient does have fluctuance over her lateral malleolus, and I have recommended incision and drainage procedure. She has a grossly infected left lower extremity. A new MRI scan has been ordered by Dr. Angela Darling.   The patient will 07:00:44  [facility] [id number]  [facility]/    cc: Lupe Kay M.D.

## 2020-02-15 LAB
GLUCOSE BLD-MCNC: 100 MG/DL (ref 70–99)
GLUCOSE BLD-MCNC: 100 MG/DL (ref 70–99)
GLUCOSE BLD-MCNC: 104 MG/DL (ref 70–99)
GLUCOSE BLD-MCNC: 125 MG/DL (ref 70–99)
HGB BLD-MCNC: 11 G/DL (ref 12–16)

## 2020-02-15 PROCEDURE — 99232 SBSQ HOSP IP/OBS MODERATE 35: CPT | Performed by: HOSPITALIST

## 2020-02-15 NOTE — PROGRESS NOTES
Patient returned to unit from PACU. A/ox4, denies pain. Unable to move foot at this time, popliteal nerve block in place. Plan to start pt on PO pain medication once tolerating diet. Updated pts sister to current status as requested earlier today.

## 2020-02-15 NOTE — PLAN OF CARE
Pt. Admitted for [facility] cellulitis on 02-13-20, I&D of L foot with Dr. Katelyn Cortez on 02-14-20, POD 1. Pain level is well controlled on oral pain meds. Block still functioning, hasn't been out of bed. Orders are TTWB to E, will work with PT/OT later today.  Eduarda

## 2020-02-15 NOTE — OPERATIVE REPORT
[address]    PATIENT'S NAME: Sierra July   ATTENDING PHYSICIAN: Robel Schultz D.O.   OPERATING PHYSICIAN: Taylor Woo M.D.    PATIENT ACCOUNT#:   [de-identified]    LOCATION:  [address] [place] [address]  MEDICAL RECORD #:   [id number]       DATE OF BIRTH bacitracin. I packed the wound open with 1-inch iodoform gauze. Bacitracin ointment and Xeroform was used to cover the entire leg in the area of the cellulitis. The patient went to recovery room in stable condition.   There were no family members present

## 2020-02-15 NOTE — PLAN OF CARE
Patient  is alert and oriented x4, left leg was kept elevated , still with decreased sensation due to nerve block. Has been up with therapy and tolerated activity well, WBAT, Reported some increase in pain , medicated with Norco with good relieve.  VS remai

## 2020-02-15 NOTE — PROGRESS NOTES
[facility]  Progress Note     Debera Latin Patient Status:  Inpatient    1975 MRN [id number]   [facility] 3SW-A Attending [facility] Day # 2 PCP Joseph Encinas MD     Chief Complaint: LLE wound    S: Elfredia Setter 7.9 8.8*  --        Estimated Creatinine Clearance: 80.7 mL/min (based on SCr of 0.76 mg/dL). Recent Labs   Lab 02/14/20  0510   PTP 13.9   INR 1.03       No results for input(s): TROP, CK in the last 168 hours.          Imaging: Imaging data reviewed in

## 2020-02-15 NOTE — PROGRESS NOTES
[address] [address] Patient Status:  Inpatient    1975 MRN [id number]   [facility] 3SW-A Attending [facility] Day # 2 PCP Roderick Juarez MD     Subjective:  S/P I&D L leg POD #1  Systemic or Specific

## 2020-02-15 NOTE — OCCUPATIONAL THERAPY NOTE
OCCUPATIONAL THERAPY QUICK EVALUATION - INPATIENT    Room Number: 381/381-A  Evaluation Date: 2/15/2020     Type of Evaluation: Initial  Presenting Problem: LLE Cellulitis s/p I&D 2/14 L ankle    Physician Order: IP Consult to Occupational Therapy  Reason WFL    BUE extremity strength is within functional limits     NEUROLOGICAL FINDINGS  Neurological Findings: None                ACTIVITY TOLERANCE        RA, no SOB    O2 SATURATIONS           ACTIVITIES OF DAILY LIVING ASSESSMENT  AM-PAC ‘6-Clicks’ Inpati session/findings; All patient questions and concerns addressed;SCDs in place    ASSESSMENT     Patient is a 38year old female admitted on 2/13/2020 for LLE cellulitis and I&D L ankle. Complete medical history and occupational profile noted above.  Functiona sup  Patient able to dress lower extremities: safely with intermittent sup  Patient/Caregiver able to demonstrate safety with ADLS: safely with intermittent sup

## 2020-02-15 NOTE — PROGRESS NOTES
[facility]                INFECTIOUS DISEASE PROGRESS NOTE    Yvan Hyatt Patient Status:  Inpatient    1975 MRN [id number]   [facility] 4NW-A Attending Noelle Sidhu MD   Hosp Day # 2 PCP MD Ariadne Dillard <18 hours N/A   2.  BLOOD CULTURE     Status: None (Preliminary result)    Collection Time: 02/14/20  1:55 AM   Result Value Ref Range    Blood Culture Result No Growth 1 Day N/A         Problem list reviewed:  Patient Active Problem List:     Left leg cell

## 2020-02-15 NOTE — PHYSICAL THERAPY NOTE
PHYSICAL THERAPY EVALUATION - INPATIENT     Room Number: 381/381-A  Evaluation Date: 2/15/2020  Type of Evaluation: Initial  Physician Order: PT Eval and Treat    Presenting Problem: s/p left LE I&D with extensive debridement  due to abscess 2/14/202 to her bedroom.     SUBJECTIVE  \"I will do whatever I can, I feel pretty good\"    Patient self-stated goal is to go home    OBJECTIVE  Precautions: None  Fall Risk: High fall risk    WEIGHT BEARING RESTRICTION  Weight Bearing Restriction: L lower extremit Assessment   Gait Assistance: Dependent assistance(actual supervision)  Distance (ft): 3  Assistive Device: Rolling walker  Pattern: L Decreased stance time  Stoop/Curb Assistance: Not tested  Comment : above based on fim def    Skilled Therapy Provided: p level of impairment may benefit from home with home PT. Based on this evaluation, patient's clinical presentation is evolving and overall the evaluation complexity is considered moderate.   These impairments and comorbidities manifest themselves as functio

## 2020-02-16 PROBLEM — G47.34 NOCTURNAL HYPOXIA: Status: ACTIVE | Noted: 2020-02-16

## 2020-02-16 LAB
ATRIAL RATE: 90 BPM
GLUCOSE BLD-MCNC: 101 MG/DL (ref 70–99)
GLUCOSE BLD-MCNC: 115 MG/DL (ref 70–99)
GLUCOSE BLD-MCNC: 128 MG/DL (ref 70–99)
GLUCOSE BLD-MCNC: 98 MG/DL (ref 70–99)
HGB BLD-MCNC: 11 G/DL (ref 12–16)
P AXIS: 49 DEGREES
P-R INTERVAL: 172 MS
Q-T INTERVAL: 352 MS
QRS DURATION: 76 MS
QTC CALCULATION (BEZET): 430 MS
R AXIS: 52 DEGREES
T AXIS: 7 DEGREES
VENTRICULAR RATE: 90 BPM

## 2020-02-16 PROCEDURE — 99232 SBSQ HOSP IP/OBS MODERATE 35: CPT | Performed by: HOSPITALIST

## 2020-02-16 NOTE — PLAN OF CARE
Patient  is alert and oriented x4, left leg was kept elevated , Has been up with therapy and tolerated activity well, WBAT, Reports mild pain , medicated with Norco with good relieve.  VS remain stable, was on O2 overnight but maintained on RA throughout th

## 2020-02-16 NOTE — PROGRESS NOTES
[address] [place] [address] [employer] Author: Karen Yang MD     1975 MRN [id number]   [facility]  Admission 20      Last Hospital Discharge 20 Ramy Marcum 15 of Discharge 20       Date of Admission: 20 1 Application topically 3 (three) times daily. back  ceFAZolin sodium 2 GM/20ML Intravenous Solution Prefilled Syringe, Inject 20 mL (2 g total) into the vein every 8 (eight) hours.   metFORMIN HCl 500 MG Oral Tab, Take 1 tablet (500 mg total) by mouth 2 (t Breath sounds: Normal breath sounds. No stridor. No wheezing or rales. Chest:      Chest wall: No tenderness. Abdominal:      General: Bowel sounds are normal. There is no distension. Palpations: Abdomen is soft. There is no mass.       Tenderness: Oni Rosales MD on 2/01/2020 at 15:28     Approved by: Oni Rosales MD on 2/01/2020 at 15:28          Mri Lower Leg (w+wo), Left (cpt=73720)    Result Date: 2/4/2020  PROCEDURE:  MRI LOWER LEG (W+WO), LEFT (CPT=73720)  COMPARISON:  None.   INDICATIONS lower left leg swelling/redness/blistering, r/o cellulitis  TECHNIQUE:  Real time, grey scale, and duplex ultrasound was used to evaluate the lower extremity venous system.  B-mode two-dimensional images of the vascular structures, Doppler spectral analysis (L) 8.5 - 10.1 mg/dL    Calculated Osmolality 279 275 - 295 mOsm/kg    GFR, Non- 95 >=60    GFR, -American 110 >=60    AST 21 15 - 37 U/L    ALT 17 13 - 56 U/L    Alkaline Phosphatase 78 37 - 98 U/L    Bilirubin, Total 0.4 0.1 - 2.0 1.6 - 2.6 mg/dL   PROTHROMBIN TIME (PT)    Collection Time: 02/14/20  5:10 AM   Result Value Ref Range    PT 13.9 12.5 - 14.7 seconds    INR 1.03 0.90 - 1.10   CBC W/ DIFFERENTIAL    Collection Time: 02/14/20  5:10 AM   Result Value Ref Range    WBC 11.1 ( ANAEROBIC CULTURE    Collection Time: 02/14/20  4:02 PM   Result Value Ref Range    Anaerobic Culture Pending    POCT GLUCOSE    Collection Time: 02/14/20  6:20 PM   Result Value Ref Range    POC Glucose 83 70 - 99 mg/dL   POCT GLUCOSE    Collection Time Therapy  Respiratory Therapy  wound care    I anticipate that she will need 2 weeks of therapy and recooperation before an eventual transition to home and we will work on her discharge needs.        Jeannie Ortega MD

## 2020-02-16 NOTE — PROGRESS NOTES
[facility]  Progress Note     Raoul Castillo Patient Status:  Inpatient    1975 MRN [id number]   [facility] 3SW-A Attending Eduard Newell [facility] Day # 3 PCP Anastasia Cheadle, MD     Chief Complaint: LLE wound    S: Antonia Hides --    ALT 28 17  --    BILT 0.4 0.4  --    TP 7.9 8.8*  --        Estimated Creatinine Clearance: 80.7 mL/min (based on SCr of 0.76 mg/dL).     Recent Labs   Lab 02/14/20  0510   PTP 13.9   INR 1.03       No results for input(s): TROP, CK in the last 168 ho

## 2020-02-16 NOTE — CM/SW NOTE
CM received order for nocturnal oxygen and home IV ABT. Per SW note, patient is planning to return to [facility] upon discharge. CM met with patient and she would prefer to discharge home with IV ABT and HHC. Patient has limited to no support system.

## 2020-02-16 NOTE — PROGRESS NOTES
Patient is now postop day 2 left ankle abscess I&D. She has no new complaints. Exam of the left lower extremity: There is dried skin/eschar over the majority of the leg.   The wound at the lateral ankle is benign without gross purulence, the packing was

## 2020-02-16 NOTE — PHYSICAL THERAPY NOTE
PHYSICAL THERAPY TREATMENT NOTE - INPATIENT    Room Number: 381/381-A     Session: 1  Number of Visits to Meet Established Goals: 6    Presenting Problem: I&D L malleolar abscess    Problem List  Active Problems:    Cellulitis and abscess of left leg    M AM-PAC Score:  Raw Score: 19   Approx Degree of Impairment: 41.77%   Standardized Score (AM-PAC Scale): 45.44   CMS Modifier (G-Code): CK    FUNCTIONAL ABILITY STATUS  Gait Assessment   Gait Assistance: Supervision  Distance (ft): 20, 20, 10  Assisti as doing the stairs. Pt is also agreeable to doing LE exs & ambulating to gait with nursing as able during the day. Will continue to upgrade mobility towards goals.       DISCHARGE RECOMMENDATIONS  PT Discharge Recommendations: Home     PLAN  PT Treatment

## 2020-02-16 NOTE — PLAN OF CARE
Pt. Admitted for LLE cellulitis on 02-13-20, I&D of L foot with Dr. Little Garcia on 02-14-20, POD 2. Pain level is well controlled on oral pain meds. Incision on L foot is cdi with acewrap, elevating on 2 pillows.  Orders are to change dressing once per shift but

## 2020-02-16 NOTE — HOME CARE LIAISON
Received referral from [address] [place] [address] You for home health services. However, per Charito's (FERNANDO) note on 2/14, this patient is planning to return to [address] [address]. Will have covering TNL follow up with FERNANDO/ODETTE on 2/17.

## 2020-02-17 VITALS
DIASTOLIC BLOOD PRESSURE: 84 MMHG | SYSTOLIC BLOOD PRESSURE: 120 MMHG | RESPIRATION RATE: 16 BRPM | BODY MASS INDEX: 66 KG/M2 | WEIGHT: 293 LBS | OXYGEN SATURATION: 95 % | TEMPERATURE: 98 F | HEART RATE: 81 BPM

## 2020-02-17 PROBLEM — Z47.89 ORTHOPEDIC AFTERCARE: Status: ACTIVE | Noted: 2020-02-17

## 2020-02-17 LAB
GLUCOSE BLD-MCNC: 106 MG/DL (ref 70–99)
GLUCOSE BLD-MCNC: 112 MG/DL (ref 70–99)
GLUCOSE BLD-MCNC: 114 MG/DL (ref 70–99)
HGB BLD-MCNC: 10.9 G/DL (ref 12–16)

## 2020-02-17 PROCEDURE — 05HY33Z INSERTION OF INFUSION DEVICE INTO UPPER VEIN, PERCUTANEOUS APPROACH: ICD-10-PCS | Performed by: HOSPITALIST

## 2020-02-17 PROCEDURE — 99239 HOSP IP/OBS DSCHRG MGMT >30: CPT | Performed by: HOSPITALIST

## 2020-02-17 RX ORDER — CEFAZOLIN SODIUM/WATER 2 G/20 ML
2 SYRINGE (ML) INTRAVENOUS EVERY 6 HOURS
Qty: 2240 ML | Refills: 0 | Status: SHIPPED | OUTPATIENT
Start: 2020-02-17 | End: 2020-03-04

## 2020-02-17 RX ORDER — CEFAZOLIN SODIUM/WATER 2 G/20 ML
2 SYRINGE (ML) INTRAVENOUS EVERY 6 HOURS
Qty: 600 ML | Refills: 0 | Status: SHIPPED | OUTPATIENT
Start: 2020-02-17 | End: 2020-03-04

## 2020-02-17 RX ORDER — HYDROCODONE BITARTRATE AND ACETAMINOPHEN 5; 325 MG/1; MG/1
1-2 TABLET ORAL EVERY 4 HOURS PRN
Qty: 30 TABLET | Refills: 0 | Status: SHIPPED | OUTPATIENT
Start: 2020-02-17

## 2020-02-17 RX ORDER — SODIUM CHLORIDE 0.9 % (FLUSH) 0.9 %
10 SYRINGE (ML) INJECTION EVERY 12 HOURS
Status: DISCONTINUED | OUTPATIENT
Start: 2020-02-17 | End: 2020-02-17

## 2020-02-17 NOTE — PLAN OF CARE
PT AOX4 this pm. Dressing to LLE CDI, elevated on two pillows, reports moderate pain treated with prn po pain meds. VSS on 3 L O2 at night. Wound care to see during day, midline placement to be done during day shift as well. Voiding freely.  Safety precs in

## 2020-02-17 NOTE — CONSULTS
[facility]  Report of Inpatient Wound Care Consultation     Darleennico Edmar Patient Status:  Inpatient    1975 MRN [id number]   [facility] 3SW-A 381/381-A Attending [address]  [place] Day # 4 PCP No primary car direct result of an accident?  No  Wound Condition: Acute  Wound Status: Not Healed    Wound Measurements:    Wound measurements taken: Yes   Length (cm): 2.7  Width (cm): 0.8  Depth (cm): 1    Hypergranulation: No    Tunneling:  No    Undermining:  Yes: si Care pager at #[phone] if you have any questions about this consultation and plan of care. Time Spent 35.     Thank you,     Teofilo Hill RN  [address] [address]

## 2020-02-17 NOTE — CM/SW NOTE
CM received call from [facility] that patient needs a qualifying diagnosis for home oxygen and overnight testing done. The above information relayed to Casey KING via phone.     Myra PIEDRA, 02/17/20, 1:12 PM

## 2020-02-17 NOTE — CM/SW NOTE
Met with pt to discuss DC planning - home with Wendy Chris and IV abx vs Elizabeth PLATT. PT recommending home. Plan for wound care consult today for recommendations and instructions for wound care.   Discussed concerns about pt's ability to manage dressing changes i

## 2020-02-17 NOTE — PAYOR COMM NOTE
REF: [id number]   APPROVED 2/13/20-2/15/20 - [facility] CLINICAL UPDATE FOR 2/15/20-2/16/20    2/15/20  Chief Complaint: LLE wound     S: Patient seen and examined at bedside. Patient currently not having any pain leg.   No overnight events or any new complain Aspart Pen  1-10 Units Subcutaneous TID AC and HS         ASSESSMENT / PLAN:      1. Left leg cellulitis with abscess due to group A streptococcal infection and Staphylococcus  1. S/P I&D  POD #1  2. Continue IV antibiotics  3. ID and Ortho on consult  4. --   --  1.03  --   --       Lab 02/13/20  0730 02/14/20  0155 02/14/20  0510   GLU 88 132* 115*   BUN 7 10 11   CREATSERUM 0.62 0.76 0.76   GFRAA 126 110 110   GFRNAA 109 95 95   CA 8.5 8.4* 8.9   ALB 1.8* 2.1*  --    * 134* 135*   K 4.2 3.9 4.2 Dose Route     2/17/2020 0946 Given 7500 Units Subcutaneous (Left Lower Abdomen)     2/16/2020 2136 Given 7500 Units Subcutaneous (Right Upper Abdomen)           HYDROcodone-acetaminophen (NORCO) 5-325 MG per tab 1 tablet       Date Action Dose Route     2

## 2020-02-17 NOTE — PHYSICAL THERAPY NOTE
IP PT attempted. Pt in bed and reports difficulty sleeping last noc. Pt reports fatigue and politely refusing skilled PT at this time. Will re-attempt as schedule permits with plan for attempting stair negotiation.

## 2020-02-17 NOTE — PROGRESS NOTES
[facility]PITAL[facility]  Progress Note     Gorge Range Patient Status:  Inpatient    1975 MRN [id number]   [facility] 3SW-A Attending Mathieu Jain [address] Day # 4 PCP No primary care provider on file.      Chief Complaint: LL BILT 0.4 0.4  --    TP 7.9 8.8*  --        Estimated Creatinine Clearance: 80.7 mL/min (based on SCr of 0.76 mg/dL). Recent Labs   Lab 02/14/20  0510   PTP 13.9   INR 1.03       No results for input(s): TROP, CK in the last 168 hours.          Imaging:

## 2020-02-17 NOTE — PROGRESS NOTES
[facility]                INFECTIOUS DISEASE PROGRESS NOTE    Rice Patience Patient Status:  Inpatient    1975 MRN [id number]   [facility] 4NW-A Attending Jessica Gonzalez MD   Hosp Day # 4 PCP No primary care provider on Encounter on 02/13/20   1. ANAEROBIC CULTURE     Status: None (Preliminary result)    Collection Time: 02/14/20  4:02 PM   Result Value Ref Range    Anaerobic Culture No Anaerobes to date N/A   2.  AEROBIC BACTERIAL CULTURE     Status: Abnormal    Collectio

## 2020-02-18 ENCOUNTER — NURSE ONLY (OUTPATIENT)
Dept: LAB | Age: 45
End: 2020-02-18
Attending: FAMILY MEDICINE
Payer: COMMERCIAL

## 2020-02-18 DIAGNOSIS — B95.61 STAPHYLOCOCCUS AUREUS SUPERFICIAL FOLLICULITIS: Primary | ICD-10-CM

## 2020-02-18 DIAGNOSIS — L73.9 STAPHYLOCOCCUS AUREUS SUPERFICIAL FOLLICULITIS: Primary | ICD-10-CM

## 2020-02-18 LAB
ANION GAP SERPL CALC-SCNC: 6 MMOL/L (ref 0–18)
BASOPHILS # BLD AUTO: 0.07 X10(3) UL (ref 0–0.2)
BASOPHILS NFR BLD AUTO: 0.9 %
BUN BLD-MCNC: 6 MG/DL (ref 7–18)
BUN/CREAT SERPL: 11.1 (ref 10–20)
CALCIUM BLD-MCNC: 8.7 MG/DL (ref 8.5–10.1)
CHLORIDE SERPL-SCNC: 100 MMOL/L (ref 98–112)
CO2 SERPL-SCNC: 30 MMOL/L (ref 21–32)
CREAT BLD-MCNC: 0.54 MG/DL (ref 0.55–1.02)
DEPRECATED RDW RBC AUTO: 57.5 FL (ref 35.1–46.3)
EOSINOPHIL # BLD AUTO: 0.08 X10(3) UL (ref 0–0.7)
EOSINOPHIL NFR BLD AUTO: 1 %
ERYTHROCYTE [DISTWIDTH] IN BLOOD BY AUTOMATED COUNT: 15.9 % (ref 11–15)
GLUCOSE BLD-MCNC: 104 MG/DL (ref 70–99)
HCT VFR BLD AUTO: 37.6 % (ref 35–48)
HGB BLD-MCNC: 11.6 G/DL (ref 12–16)
IMM GRANULOCYTES # BLD AUTO: 0.06 X10(3) UL (ref 0–1)
IMM GRANULOCYTES NFR BLD: 0.8 %
LYMPHOCYTES # BLD AUTO: 1.98 X10(3) UL (ref 1–4)
LYMPHOCYTES NFR BLD AUTO: 25.5 %
MCH RBC QN AUTO: 30.8 PG (ref 26–34)
MCHC RBC AUTO-ENTMCNC: 30.9 G/DL (ref 31–37)
MCV RBC AUTO: 99.7 FL (ref 80–100)
MONOCYTES # BLD AUTO: 0.74 X10(3) UL (ref 0.1–1)
MONOCYTES NFR BLD AUTO: 9.5 %
NEUTROPHILS # BLD AUTO: 4.83 X10 (3) UL (ref 1.5–7.7)
NEUTROPHILS # BLD AUTO: 4.83 X10(3) UL (ref 1.5–7.7)
NEUTROPHILS NFR BLD AUTO: 62.3 %
OSMOLALITY SERPL CALC.SUM OF ELEC: 280 MOSM/KG (ref 275–295)
PATIENT FASTING Y/N/NP: YES
PLATELET # BLD AUTO: 403 10(3)UL (ref 150–450)
POTASSIUM SERPL-SCNC: 3.9 MMOL/L (ref 3.5–5.1)
RBC # BLD AUTO: 3.77 X10(6)UL (ref 3.8–5.3)
SODIUM SERPL-SCNC: 136 MMOL/L (ref 136–145)
WBC # BLD AUTO: 7.8 X10(3) UL (ref 4–11)

## 2020-02-18 PROCEDURE — 85025 COMPLETE CBC W/AUTO DIFF WBC: CPT

## 2020-02-18 PROCEDURE — 80048 BASIC METABOLIC PNL TOTAL CA: CPT

## 2020-02-18 NOTE — CM/SW NOTE
02/18/20 0800   Discharge disposition   Expected discharge disposition Skilled Nurs   Name of Deloris Dorota [address] [facility]   Discharge transportation [facility]       Patient discharged on 2/17 as previously planned.

## 2020-02-19 ENCOUNTER — INITIAL APN SNF VISIT (OUTPATIENT)
Dept: INTERNAL MEDICINE CLINIC | Age: 45
End: 2020-02-19

## 2020-02-19 VITALS
SYSTOLIC BLOOD PRESSURE: 135 MMHG | OXYGEN SATURATION: 93 % | BODY MASS INDEX: 68 KG/M2 | TEMPERATURE: 97 F | HEART RATE: 99 BPM | DIASTOLIC BLOOD PRESSURE: 88 MMHG | RESPIRATION RATE: 20 BRPM | WEIGHT: 293 LBS

## 2020-02-19 DIAGNOSIS — L03.116 LEFT LEG CELLULITIS: Primary | ICD-10-CM

## 2020-02-19 DIAGNOSIS — R52 PAIN: ICD-10-CM

## 2020-02-19 DIAGNOSIS — I10 BENIGN ESSENTIAL HTN: ICD-10-CM

## 2020-02-19 DIAGNOSIS — E11.9 TYPE 2 DIABETES MELLITUS WITHOUT COMPLICATION, WITHOUT LONG-TERM CURRENT USE OF INSULIN (HCC): ICD-10-CM

## 2020-02-19 DIAGNOSIS — E66.01 MORBID OBESITY WITH BODY MASS INDEX (BMI) OF 60.0 TO 69.9 IN ADULT (HCC): ICD-10-CM

## 2020-02-19 DIAGNOSIS — L02.416 CELLULITIS AND ABSCESS OF LEFT LEG: ICD-10-CM

## 2020-02-19 DIAGNOSIS — G47.00 INSOMNIA, UNSPECIFIED TYPE: ICD-10-CM

## 2020-02-19 DIAGNOSIS — L03.116 CELLULITIS AND ABSCESS OF LEFT LEG: ICD-10-CM

## 2020-02-19 PROCEDURE — 99310 SBSQ NF CARE HIGH MDM 45: CPT | Performed by: NURSE PRACTITIONER

## 2020-02-19 NOTE — PROGRESS NOTES
Crow Santos  : 1975  Age 38year old  female patient is admitted to Facility: The [address] [place] [address] at [facility] for [employer].     [address] [place] [address] Drive date:  2020, then again 2020  Discharge date to [facility]:  2020 then 2020  GRISELDA:   LATISHA Cannabis      Comment: 2-3 times a week       ALLERGIES:  No Known Allergies    POA none    CODE STATUS:  Full Code    ADVANCED CARE PLANNING TEAM: None      CURRENT MEDICATIONS   Current Outpatient Medications   Medication Sig Dispense Refill   • ceFAZoli heartburn  Gu: No urinary frequency, urgency or dysuria  MUSCULOSKELETAL:no joint complaints upper or lower extremities  NEURO:no sensory or motor complaint  PSYCHE: anxiety, insomnia  HEMATOLOGY:denies bruising, denies excessive bleeding  ENDOCRINE: denie ANIONGAP 6 02/18/2020    GFRNAA 114 02/18/2020    GFRAA 132 02/18/2020    CA 8.7 02/18/2020    OSMOCALC 280 02/18/2020    ALKPHO 78 02/14/2020    AST 21 02/14/2020    ALT 17 02/14/2020    BILT 0.4 02/14/2020    TP 8.8 (H) 02/14/2020    ALB 2.1 (L) 02/14/20 screenings       spent w/ patient and reviewing medical records, labs, completing medication reconciliation and entering orders to establish plan of care in [facility].     LIZY Martinez  02/19/20   4:03 PM

## 2020-02-20 ENCOUNTER — NURSE ONLY (OUTPATIENT)
Dept: LAB | Age: 45
End: 2020-02-20
Attending: NURSE PRACTITIONER
Payer: COMMERCIAL

## 2020-02-20 DIAGNOSIS — L03.116 CELLULITIS OF LEFT FOOT: Primary | ICD-10-CM

## 2020-02-20 LAB
ALBUMIN SERPL-MCNC: 2.3 G/DL (ref 3.4–5)
ALBUMIN/GLOB SERPL: 0.3 {RATIO} (ref 1–2)
ALP LIVER SERPL-CCNC: 69 U/L (ref 37–98)
ALT SERPL-CCNC: 24 U/L (ref 13–56)
ANION GAP SERPL CALC-SCNC: 4 MMOL/L (ref 0–18)
AST SERPL-CCNC: 22 U/L (ref 15–37)
BASOPHILS # BLD AUTO: 0.09 X10(3) UL (ref 0–0.2)
BASOPHILS NFR BLD AUTO: 1.1 %
BILIRUB SERPL-MCNC: 0.3 MG/DL (ref 0.1–2)
BUN BLD-MCNC: 7 MG/DL (ref 7–18)
BUN/CREAT SERPL: 11.3 (ref 10–20)
CALCIUM BLD-MCNC: 8.7 MG/DL (ref 8.5–10.1)
CHLORIDE SERPL-SCNC: 103 MMOL/L (ref 98–112)
CO2 SERPL-SCNC: 30 MMOL/L (ref 21–32)
CREAT BLD-MCNC: 0.62 MG/DL (ref 0.55–1.02)
DEPRECATED RDW RBC AUTO: 58.4 FL (ref 35.1–46.3)
EOSINOPHIL # BLD AUTO: 0.17 X10(3) UL (ref 0–0.7)
EOSINOPHIL NFR BLD AUTO: 2.1 %
ERYTHROCYTE [DISTWIDTH] IN BLOOD BY AUTOMATED COUNT: 15.8 % (ref 11–15)
GLOBULIN PLAS-MCNC: 6.7 G/DL (ref 2.8–4.4)
GLUCOSE BLD-MCNC: 108 MG/DL (ref 70–99)
HCT VFR BLD AUTO: 38.2 % (ref 35–48)
HGB BLD-MCNC: 11.5 G/DL (ref 12–16)
IMM GRANULOCYTES # BLD AUTO: 0.05 X10(3) UL (ref 0–1)
IMM GRANULOCYTES NFR BLD: 0.6 %
LYMPHOCYTES # BLD AUTO: 1.78 X10(3) UL (ref 1–4)
LYMPHOCYTES NFR BLD AUTO: 22.4 %
M PROTEIN MFR SERPL ELPH: 9 G/DL (ref 6.4–8.2)
MCH RBC QN AUTO: 30.7 PG (ref 26–34)
MCHC RBC AUTO-ENTMCNC: 30.1 G/DL (ref 31–37)
MCV RBC AUTO: 101.9 FL (ref 80–100)
MONOCYTES # BLD AUTO: 0.78 X10(3) UL (ref 0.1–1)
MONOCYTES NFR BLD AUTO: 9.8 %
NEUTROPHILS # BLD AUTO: 5.06 X10 (3) UL (ref 1.5–7.7)
NEUTROPHILS # BLD AUTO: 5.06 X10(3) UL (ref 1.5–7.7)
NEUTROPHILS NFR BLD AUTO: 64 %
OSMOLALITY SERPL CALC.SUM OF ELEC: 283 MOSM/KG (ref 275–295)
PATIENT FASTING Y/N/NP: YES
PLATELET # BLD AUTO: 372 10(3)UL (ref 150–450)
POTASSIUM SERPL-SCNC: 4.1 MMOL/L (ref 3.5–5.1)
RBC # BLD AUTO: 3.75 X10(6)UL (ref 3.8–5.3)
SODIUM SERPL-SCNC: 137 MMOL/L (ref 136–145)
WBC # BLD AUTO: 7.9 X10(3) UL (ref 4–11)

## 2020-02-20 PROCEDURE — 80053 COMPREHEN METABOLIC PANEL: CPT

## 2020-02-20 PROCEDURE — 85025 COMPLETE CBC W/AUTO DIFF WBC: CPT

## 2020-02-24 NOTE — DISCHARGE SUMMARY
[facility]  DISCHARGE SUMMARY     Frances Power Patient Status:  Inpatient    1975 MRN [id number]   [facility] 3SW-A Attending No att. providers found   [facility] Day # 4 PCP No primary care provider on file.      Date of Admission readmission after discharge from the hospital.    Procedures during hospitalization:   • LLE I&D    Incidental or significant findings and recommendations (brief descriptions):  • N/A    Lab/Test results pending at Discharge:   · N/A    Consultants:  • Ort ondansetron 4 MG Tbdp  Commonly known as:  ZOFRAN-ODT      Take 4 mg by mouth every 8 (eight) hours as needed for Nausea.    Refills:  0     traMADol HCl 50 MG Tabs  Commonly known as:  ULTRAM      Take 1 tablet (50 mg total) by mouth every 6 (six) hours [employer]. Enter through the revolving doors located on the ground floor. Gale Pardo left past the Information Desk and proceed to the [address]. 3/3/2020 10:00 AM  WC VISIT [1948] 60 min.  [address]

## 2020-02-25 ENCOUNTER — OFFICE VISIT (OUTPATIENT)
Dept: WOUND CARE | Facility: HOSPITAL | Age: 45
End: 2020-02-25
Attending: NURSE PRACTITIONER
Payer: COMMERCIAL

## 2020-02-25 ENCOUNTER — EXTERNAL FACILITY (OUTPATIENT)
Dept: FAMILY MEDICINE CLINIC | Facility: CLINIC | Age: 45
End: 2020-02-25

## 2020-02-25 ENCOUNTER — NURSE ONLY (OUTPATIENT)
Dept: LAB | Age: 45
End: 2020-02-25
Attending: FAMILY MEDICINE
Payer: COMMERCIAL

## 2020-02-25 DIAGNOSIS — Z91.81 AT HIGH RISK FOR FALLS: ICD-10-CM

## 2020-02-25 DIAGNOSIS — E66.2 OBESITY HYPOVENTILATION SYNDROME (HCC): ICD-10-CM

## 2020-02-25 DIAGNOSIS — E11.628 TYPE 2 DIABETES MELLITUS WITH SKIN COMPLICATION (HCC): Primary | ICD-10-CM

## 2020-02-25 DIAGNOSIS — L02.416 CELLULITIS AND ABSCESS OF LEFT LEG: ICD-10-CM

## 2020-02-25 DIAGNOSIS — B95.61 STAPHYLOCOCCUS AUREUS SUPERFICIAL FOLLICULITIS: Primary | ICD-10-CM

## 2020-02-25 DIAGNOSIS — E11.9 TYPE 2 DIABETES MELLITUS WITHOUT COMPLICATION, WITHOUT LONG-TERM CURRENT USE OF INSULIN (HCC): Chronic | ICD-10-CM

## 2020-02-25 DIAGNOSIS — G47.34 NOCTURNAL HYPOXIA: ICD-10-CM

## 2020-02-25 DIAGNOSIS — L03.116 LEFT LEG CELLULITIS: ICD-10-CM

## 2020-02-25 DIAGNOSIS — K59.00 CONSTIPATION, UNSPECIFIED CONSTIPATION TYPE: ICD-10-CM

## 2020-02-25 DIAGNOSIS — R26.81 GAIT INSTABILITY: ICD-10-CM

## 2020-02-25 DIAGNOSIS — L03.116 LEFT LEG CELLULITIS: Primary | ICD-10-CM

## 2020-02-25 DIAGNOSIS — Z47.89 ORTHOPEDIC AFTERCARE: ICD-10-CM

## 2020-02-25 DIAGNOSIS — L73.9 STAPHYLOCOCCUS AUREUS SUPERFICIAL FOLLICULITIS: Primary | ICD-10-CM

## 2020-02-25 DIAGNOSIS — E66.01 MORBID OBESITY WITH BODY MASS INDEX (BMI) OF 60.0 TO 69.9 IN ADULT (HCC): ICD-10-CM

## 2020-02-25 DIAGNOSIS — I10 BENIGN ESSENTIAL HTN: ICD-10-CM

## 2020-02-25 DIAGNOSIS — L03.116 CELLULITIS AND ABSCESS OF LEFT LEG: ICD-10-CM

## 2020-02-25 LAB
ANION GAP SERPL CALC-SCNC: 6 MMOL/L (ref 0–18)
BASOPHILS # BLD AUTO: 0.08 X10(3) UL (ref 0–0.2)
BASOPHILS NFR BLD AUTO: 1.1 %
BUN BLD-MCNC: 10 MG/DL (ref 7–18)
BUN/CREAT SERPL: 14.9 (ref 10–20)
CALCIUM BLD-MCNC: 8.6 MG/DL (ref 8.5–10.1)
CHLORIDE SERPL-SCNC: 102 MMOL/L (ref 98–112)
CO2 SERPL-SCNC: 27 MMOL/L (ref 21–32)
CREAT BLD-MCNC: 0.67 MG/DL (ref 0.55–1.02)
DEPRECATED RDW RBC AUTO: 55.2 FL (ref 35.1–46.3)
EOSINOPHIL # BLD AUTO: 0.24 X10(3) UL (ref 0–0.7)
EOSINOPHIL NFR BLD AUTO: 3.4 %
ERYTHROCYTE [DISTWIDTH] IN BLOOD BY AUTOMATED COUNT: 15.4 % (ref 11–15)
GLUCOSE BLD-MCNC: 135 MG/DL (ref 70–99)
GLUCOSE BLD-MCNC: 96 MG/DL (ref 70–99)
HCT VFR BLD AUTO: 38.5 % (ref 35–48)
HGB BLD-MCNC: 11.7 G/DL (ref 12–16)
IMM GRANULOCYTES # BLD AUTO: 0.04 X10(3) UL (ref 0–1)
IMM GRANULOCYTES NFR BLD: 0.6 %
LYMPHOCYTES # BLD AUTO: 1.95 X10(3) UL (ref 1–4)
LYMPHOCYTES NFR BLD AUTO: 27.9 %
MCH RBC QN AUTO: 30.2 PG (ref 26–34)
MCHC RBC AUTO-ENTMCNC: 30.4 G/DL (ref 31–37)
MCV RBC AUTO: 99.2 FL (ref 80–100)
MONOCYTES # BLD AUTO: 0.55 X10(3) UL (ref 0.1–1)
MONOCYTES NFR BLD AUTO: 7.9 %
NEUTROPHILS # BLD AUTO: 4.14 X10 (3) UL (ref 1.5–7.7)
NEUTROPHILS # BLD AUTO: 4.14 X10(3) UL (ref 1.5–7.7)
NEUTROPHILS NFR BLD AUTO: 59.1 %
OSMOLALITY SERPL CALC.SUM OF ELEC: 279 MOSM/KG (ref 275–295)
PATIENT FASTING Y/N/NP: YES
PLATELET # BLD AUTO: 289 10(3)UL (ref 150–450)
POTASSIUM SERPL-SCNC: 4.1 MMOL/L (ref 3.5–5.1)
RBC # BLD AUTO: 3.88 X10(6)UL (ref 3.8–5.3)
SODIUM SERPL-SCNC: 135 MMOL/L (ref 136–145)
WBC # BLD AUTO: 7 X10(3) UL (ref 4–11)

## 2020-02-25 PROCEDURE — 29581 APPL MULTLAYER CMPRN SYS LEG: CPT

## 2020-02-25 PROCEDURE — 85025 COMPLETE CBC W/AUTO DIFF WBC: CPT

## 2020-02-25 PROCEDURE — 99308 SBSQ NF CARE LOW MDM 20: CPT | Performed by: FAMILY MEDICINE

## 2020-02-25 PROCEDURE — 80048 BASIC METABOLIC PNL TOTAL CA: CPT

## 2020-02-25 PROCEDURE — 82962 GLUCOSE BLOOD TEST: CPT

## 2020-02-25 NOTE — PROGRESS NOTES
INFECTIOUS DISEASE PROGRESS NOTE    Pankaj Banuelos Patient Status:  Inpatient    1975 MRN [id number]   Location [address] [facility]       PCP No primary care provider on file.      Antibiotics:#23-cefazolin      Subjective:  Has been left leg     Morbid obesity with body mass index (BMI) of 60.0 to 69.9 in adult [facility])     Diabetes ([facility] [address]     Nocturnal hypoxia     Incision and drainage left knee  Global 02/24/2020          ASSESSMENT/PLAN:  1.  Extensive RLE cellulits/group A strep  -woun

## 2020-02-25 NOTE — PROGRESS NOTES
Subjective    Chief Complaint  This information was obtained from the patient  Pt here for a f/u to wound on left leg. Pt [facility] Dressing changes have been done QOD. Pt stated very little drainage.  Pt 2-25-20 patient returns today, she was admitted from feb 13-17. Pt was admitted to the orth/spine unit and was continued on IV abx by ID who was on consult. Pt was seen by orthopedic surgery and  Went for a I&D of there left lower extremity.  Pt  Was foun The periwound skin exhibited: Edema, Dry/Scaly, Hemosiderosis. The periwound skin did not exhibit: Induration, Crepitus, Fluctuance, Cyanosis, Ecchymosis, Erythema. The temperature of the periwound skin is Warm.  Periwound skin does not exhibit signs or sym patient is in wheelchair propelled by others for long distances, able to transfer with assist.    Integumentary (Hair, Skin)  see wound documentation. Psychiatric:  Judgment and insight intact. Alert and oriented times 3.  No evidence of depression, anxi Vitamin A: Dark green, leafy vegetables, orange or yellow vegetables, cantaloupe, fortified dairy products, liver, fortified cereals  Zinc: Fortified cereals, red meats, seafood    Decrease salt intake  S/S of Infection  Non-adherence    Wound #2 Left, Lat

## 2020-02-25 NOTE — PROGRESS NOTES
[address] [place] [address] Author: Quynh Palmer MD     1975 MRN [id number]   [facility]  Admission 20      Last Hospital Discharge 20 PCP No primary care provider on file.    Hospital of Discharge 20       Date of (four) hours as needed for Pain.  traMADol HCl 50 MG Oral Tab, Take 1 tablet (50 mg total) by mouth every 6 (six) hours as needed for Pain. ondansetron 4 MG Oral Tablet Dispersible, Take 4 mg by mouth every 8 (eight) hours as needed for Nausea.   Melatonin discharge. Pupils: Pupils are equal, round, and reactive to light. Neck:      Musculoskeletal: Normal range of motion and neck supple. Thyroid: No thyromegaly. Vascular: No JVD. Trachea: No tracheal deviation.    Cardiovascular:      R PATIENT STATED HISTORY: (As transcribed by Technologist)  Patient left lower leg cellulitis noticed Thursday afternoon. Difficulty  walking. FINDINGS:  No evidence of acute displaced fracture or dislocation. Normal mineralization.   Diffuse soft tissue cellulitis involving the left lower leg with small focus of myositis involving the medial head of gastrocnemius muscle. No evidence of drainable abscess or osteomyelitis.     Dictated by: Karen Monk MD on 2/04/2020 at 21:06     Approved by: Karen Monk -American 123 >=60    FASTING Yes    CBC W/ DIFFERENTIAL    Collection Time: 02/25/20  7:00 AM   Result Value Ref Range    WBC 7.0 4.0 - 11.0 x10(3) uL    RBC 3.88 3.80 - 5.30 x10(6)uL    HGB 11.7 (L) 12.0 - 16.0 g/dL    HCT 38.5 35.0 - 48.0 %    PL services:  Occupational Therapy  Physical Therapy  Respiratory Therapy  wound care    I anticipate that she will need 2 weeks of therapy and recooperation before an eventual transition to home and we will work on her discharge needs.        Jeannie Ortega MD

## 2020-02-26 ENCOUNTER — SNF VISIT (OUTPATIENT)
Dept: INTERNAL MEDICINE CLINIC | Age: 45
End: 2020-02-26

## 2020-02-26 VITALS
OXYGEN SATURATION: 95 % | RESPIRATION RATE: 19 BRPM | TEMPERATURE: 98 F | DIASTOLIC BLOOD PRESSURE: 91 MMHG | SYSTOLIC BLOOD PRESSURE: 141 MMHG | HEART RATE: 95 BPM

## 2020-02-26 DIAGNOSIS — Z71.89 CARE PLAN DISCUSSED WITH PATIENT: ICD-10-CM

## 2020-02-26 DIAGNOSIS — L02.416 CELLULITIS AND ABSCESS OF LEFT LEG: Primary | ICD-10-CM

## 2020-02-26 DIAGNOSIS — Z79.899 MEDICATION MANAGEMENT: ICD-10-CM

## 2020-02-26 DIAGNOSIS — Z79.2 RECEIVING INTRAVENOUS ANTIBIOTIC TREATMENT AS OUTPATIENT: ICD-10-CM

## 2020-02-26 DIAGNOSIS — L03.116 CELLULITIS AND ABSCESS OF LEFT LEG: Primary | ICD-10-CM

## 2020-02-26 PROCEDURE — 99308 SBSQ NF CARE LOW MDM 20: CPT | Performed by: NURSE PRACTITIONER

## 2020-02-26 NOTE — PROGRESS NOTES
Sobeida Garcia, 95/1975, 38year old, female    Chief Complaint:  Patient presents with:   Follow - Up  Medication Follow-Up  Med Reconcilliation  [facility] Admit date:  2/1/2020, then again 2/13/2020  Discharge date to [facility]:  2/7/2020 [facility]  [phone]  ----------------------------------------------------------------------------------------  TODAY:  Pt denies pain, good a great night's rest overnight, first time in a long time per patient.   Confirmed with Dr. Carol Marie office, s eyes  HENT: normocephalic; normal nose, no nasal drainage, mucous membranes pink, moist.  NECK: supple, non tender, FROM  BREAST: deferred  RESPIRATORY:clear, no crackles, no wheezing, no dyspnea, no cough, room air  CARDIOVASCULAR: RRR, S1 and S2, no murm trazodone 25 mg PO QHS prn for sleep and monitor     DVT prophylaxis  None; she is ambulating     GI prophylaxis  None     Labs  CBC, BMP Mon and Thurs      Follow Ups:  PCP within 7 days of DC  ID follow up   Wound clinic  Ortho Dr Diaz Sep follow up in 2 we

## 2020-02-27 ENCOUNTER — OFFICE VISIT (OUTPATIENT)
Dept: WOUND CARE | Facility: HOSPITAL | Age: 45
End: 2020-02-27
Attending: NURSE PRACTITIONER
Payer: COMMERCIAL

## 2020-02-27 ENCOUNTER — NURSE ONLY (OUTPATIENT)
Dept: LAB | Age: 45
End: 2020-02-27
Attending: NURSE PRACTITIONER
Payer: COMMERCIAL

## 2020-02-27 DIAGNOSIS — E11.628 TYPE 2 DIABETES MELLITUS WITH SKIN COMPLICATION (HCC): Primary | ICD-10-CM

## 2020-02-27 DIAGNOSIS — L03.116 CELLULITIS OF LEFT FOOT: Primary | ICD-10-CM

## 2020-02-27 DIAGNOSIS — L03.116 LEFT LEG CELLULITIS: ICD-10-CM

## 2020-02-27 LAB
ALBUMIN SERPL-MCNC: 2.8 G/DL (ref 3.4–5)
ALBUMIN/GLOB SERPL: 0.4 {RATIO} (ref 1–2)
ALP LIVER SERPL-CCNC: 74 U/L (ref 37–98)
ALT SERPL-CCNC: 20 U/L (ref 13–56)
ANION GAP SERPL CALC-SCNC: 3 MMOL/L (ref 0–18)
AST SERPL-CCNC: 28 U/L (ref 15–37)
BASOPHILS # BLD AUTO: 0.06 X10(3) UL (ref 0–0.2)
BASOPHILS NFR BLD AUTO: 1 %
BILIRUB SERPL-MCNC: 0.3 MG/DL (ref 0.1–2)
BUN BLD-MCNC: 12 MG/DL (ref 7–18)
BUN/CREAT SERPL: 18.5 (ref 10–20)
CALCIUM BLD-MCNC: 8.6 MG/DL (ref 8.5–10.1)
CHLORIDE SERPL-SCNC: 104 MMOL/L (ref 98–112)
CO2 SERPL-SCNC: 28 MMOL/L (ref 21–32)
CREAT BLD-MCNC: 0.65 MG/DL (ref 0.55–1.02)
DEPRECATED RDW RBC AUTO: 54 FL (ref 35.1–46.3)
EOSINOPHIL # BLD AUTO: 0.25 X10(3) UL (ref 0–0.7)
EOSINOPHIL NFR BLD AUTO: 4 %
ERYTHROCYTE [DISTWIDTH] IN BLOOD BY AUTOMATED COUNT: 14.8 % (ref 11–15)
GLOBULIN PLAS-MCNC: 6.5 G/DL (ref 2.8–4.4)
GLUCOSE BLD-MCNC: 100 MG/DL (ref 70–99)
GLUCOSE BLD-MCNC: 125 MG/DL (ref 70–99)
HCT VFR BLD AUTO: 39.5 % (ref 35–48)
HGB BLD-MCNC: 12 G/DL (ref 12–16)
IMM GRANULOCYTES # BLD AUTO: 0.04 X10(3) UL (ref 0–1)
IMM GRANULOCYTES NFR BLD: 0.6 %
LYMPHOCYTES # BLD AUTO: 2.03 X10(3) UL (ref 1–4)
LYMPHOCYTES NFR BLD AUTO: 32.8 %
M PROTEIN MFR SERPL ELPH: 9.3 G/DL (ref 6.4–8.2)
MCH RBC QN AUTO: 30.2 PG (ref 26–34)
MCHC RBC AUTO-ENTMCNC: 30.4 G/DL (ref 31–37)
MCV RBC AUTO: 99.2 FL (ref 80–100)
MONOCYTES # BLD AUTO: 0.45 X10(3) UL (ref 0.1–1)
MONOCYTES NFR BLD AUTO: 7.3 %
NEUTROPHILS # BLD AUTO: 3.36 X10 (3) UL (ref 1.5–7.7)
NEUTROPHILS # BLD AUTO: 3.36 X10(3) UL (ref 1.5–7.7)
NEUTROPHILS NFR BLD AUTO: 54.3 %
OSMOLALITY SERPL CALC.SUM OF ELEC: 280 MOSM/KG (ref 275–295)
PATIENT FASTING Y/N/NP: YES
PLATELET # BLD AUTO: 293 10(3)UL (ref 150–450)
POTASSIUM SERPL-SCNC: 4.5 MMOL/L (ref 3.5–5.1)
RBC # BLD AUTO: 3.98 X10(6)UL (ref 3.8–5.3)
SODIUM SERPL-SCNC: 135 MMOL/L (ref 136–145)
WBC # BLD AUTO: 6.2 X10(3) UL (ref 4–11)

## 2020-02-27 PROCEDURE — 80053 COMPREHEN METABOLIC PANEL: CPT

## 2020-02-27 PROCEDURE — 85025 COMPLETE CBC W/AUTO DIFF WBC: CPT

## 2020-02-27 PROCEDURE — 29581 APPL MULTLAYER CMPRN SYS LEG: CPT

## 2020-02-27 PROCEDURE — 82962 GLUCOSE BLOOD TEST: CPT

## 2020-02-28 ENCOUNTER — SNF VISIT (OUTPATIENT)
Dept: INTERNAL MEDICINE CLINIC | Age: 45
End: 2020-02-28

## 2020-02-28 DIAGNOSIS — Z79.2 RECEIVING INTRAVENOUS ANTIBIOTIC TREATMENT AS OUTPATIENT: ICD-10-CM

## 2020-02-28 DIAGNOSIS — L03.116 CELLULITIS AND ABSCESS OF LEFT LEG: Primary | ICD-10-CM

## 2020-02-28 DIAGNOSIS — Z71.89 CARE PLAN DISCUSSED WITH PATIENT: ICD-10-CM

## 2020-02-28 DIAGNOSIS — L02.416 CELLULITIS AND ABSCESS OF LEFT LEG: Primary | ICD-10-CM

## 2020-02-28 DIAGNOSIS — Z79.899 MEDICATION MANAGEMENT: ICD-10-CM

## 2020-02-28 PROCEDURE — 99308 SBSQ NF CARE LOW MDM 20: CPT | Performed by: NURSE PRACTITIONER

## 2020-02-29 NOTE — PROGRESS NOTES
Frances Nichols, 95/1975, 38year old, female    Chief Complaint:  Patient presents with:   Follow - Up  [address] date:  2/1/2020, then again 2/13/2020  Discharge date to [facility]:  2/7/2020 then 2/17/2020  ELOS: For follow commands  PSYCHIATRIC: calm, cooperative, mood and affect appropriate      Medications reviewed: Yes    Diagnostics reviewed:  None presently    Assessment and plan:  LLE cellulitis s/p abscess and I&D, group A strep, staph  On IV cefazolin 2 gm Q 6

## 2020-03-03 ENCOUNTER — EXTERNAL FACILITY (OUTPATIENT)
Dept: FAMILY MEDICINE CLINIC | Facility: CLINIC | Age: 45
End: 2020-03-03

## 2020-03-03 ENCOUNTER — NURSE ONLY (OUTPATIENT)
Dept: LAB | Age: 45
End: 2020-03-03
Attending: FAMILY MEDICINE
Payer: COMMERCIAL

## 2020-03-03 ENCOUNTER — OFFICE VISIT (OUTPATIENT)
Dept: WOUND CARE | Facility: HOSPITAL | Age: 45
End: 2020-03-03
Attending: NURSE PRACTITIONER
Payer: COMMERCIAL

## 2020-03-03 DIAGNOSIS — L03.116 LEFT LEG CELLULITIS: ICD-10-CM

## 2020-03-03 DIAGNOSIS — E66.01 MORBID OBESITY WITH BODY MASS INDEX (BMI) OF 60.0 TO 69.9 IN ADULT (HCC): ICD-10-CM

## 2020-03-03 DIAGNOSIS — I10 BENIGN ESSENTIAL HTN: ICD-10-CM

## 2020-03-03 DIAGNOSIS — E66.2 OBESITY HYPOVENTILATION SYNDROME (HCC): ICD-10-CM

## 2020-03-03 DIAGNOSIS — L02.416 CELLULITIS AND ABSCESS OF LEFT LEG: ICD-10-CM

## 2020-03-03 DIAGNOSIS — B95.61 STAPHYLOCOCCUS AUREUS SUPERFICIAL FOLLICULITIS: Primary | ICD-10-CM

## 2020-03-03 DIAGNOSIS — R26.81 GAIT INSTABILITY: Primary | ICD-10-CM

## 2020-03-03 DIAGNOSIS — L03.116 CELLULITIS AND ABSCESS OF LEFT LEG: ICD-10-CM

## 2020-03-03 DIAGNOSIS — E11.9 TYPE 2 DIABETES MELLITUS WITHOUT COMPLICATION, WITHOUT LONG-TERM CURRENT USE OF INSULIN (HCC): Chronic | ICD-10-CM

## 2020-03-03 DIAGNOSIS — Z91.81 AT HIGH RISK FOR FALLS: ICD-10-CM

## 2020-03-03 DIAGNOSIS — G47.34 NOCTURNAL HYPOXIA: ICD-10-CM

## 2020-03-03 DIAGNOSIS — E11.628 TYPE 2 DIABETES MELLITUS WITH SKIN COMPLICATION (HCC): Primary | ICD-10-CM

## 2020-03-03 DIAGNOSIS — L73.9 STAPHYLOCOCCUS AUREUS SUPERFICIAL FOLLICULITIS: Primary | ICD-10-CM

## 2020-03-03 DIAGNOSIS — Z47.89 ORTHOPEDIC AFTERCARE: ICD-10-CM

## 2020-03-03 LAB
ALBUMIN SERPL-MCNC: 3 G/DL (ref 3.4–5)
ALBUMIN/GLOB SERPL: 0.5 {RATIO} (ref 1–2)
ALP LIVER SERPL-CCNC: 66 U/L (ref 37–98)
ALT SERPL-CCNC: 17 U/L (ref 13–56)
ANION GAP SERPL CALC-SCNC: 5 MMOL/L (ref 0–18)
AST SERPL-CCNC: 20 U/L (ref 15–37)
BASOPHILS # BLD AUTO: 0.05 X10(3) UL (ref 0–0.2)
BASOPHILS NFR BLD AUTO: 0.8 %
BILIRUB SERPL-MCNC: 0.3 MG/DL (ref 0.1–2)
BUN BLD-MCNC: 13 MG/DL (ref 7–18)
BUN/CREAT SERPL: 23.2 (ref 10–20)
CALCIUM BLD-MCNC: 8.6 MG/DL (ref 8.5–10.1)
CHLORIDE SERPL-SCNC: 103 MMOL/L (ref 98–112)
CO2 SERPL-SCNC: 28 MMOL/L (ref 21–32)
CREAT BLD-MCNC: 0.56 MG/DL (ref 0.55–1.02)
DEPRECATED RDW RBC AUTO: 52.6 FL (ref 35.1–46.3)
EOSINOPHIL # BLD AUTO: 0.25 X10(3) UL (ref 0–0.7)
EOSINOPHIL NFR BLD AUTO: 4.1 %
ERYTHROCYTE [DISTWIDTH] IN BLOOD BY AUTOMATED COUNT: 14.6 % (ref 11–15)
GLOBULIN PLAS-MCNC: 5.8 G/DL (ref 2.8–4.4)
GLUCOSE BLD-MCNC: 104 MG/DL (ref 70–99)
GLUCOSE BLD-MCNC: 116 MG/DL (ref 70–99)
HCT VFR BLD AUTO: 38.9 % (ref 35–48)
HGB BLD-MCNC: 11.9 G/DL (ref 12–16)
IMM GRANULOCYTES # BLD AUTO: 0.02 X10(3) UL (ref 0–1)
IMM GRANULOCYTES NFR BLD: 0.3 %
LYMPHOCYTES # BLD AUTO: 2.1 X10(3) UL (ref 1–4)
LYMPHOCYTES NFR BLD AUTO: 34.3 %
M PROTEIN MFR SERPL ELPH: 8.8 G/DL (ref 6.4–8.2)
MCH RBC QN AUTO: 29.8 PG (ref 26–34)
MCHC RBC AUTO-ENTMCNC: 30.6 G/DL (ref 31–37)
MCV RBC AUTO: 97.3 FL (ref 80–100)
MONOCYTES # BLD AUTO: 0.45 X10(3) UL (ref 0.1–1)
MONOCYTES NFR BLD AUTO: 7.4 %
NEUTROPHILS # BLD AUTO: 3.25 X10 (3) UL (ref 1.5–7.7)
NEUTROPHILS # BLD AUTO: 3.25 X10(3) UL (ref 1.5–7.7)
NEUTROPHILS NFR BLD AUTO: 53.1 %
OSMOLALITY SERPL CALC.SUM OF ELEC: 282 MOSM/KG (ref 275–295)
PATIENT FASTING Y/N/NP: YES
PLATELET # BLD AUTO: 279 10(3)UL (ref 150–450)
POTASSIUM SERPL-SCNC: 4.1 MMOL/L (ref 3.5–5.1)
RBC # BLD AUTO: 4 X10(6)UL (ref 3.8–5.3)
SODIUM SERPL-SCNC: 136 MMOL/L (ref 136–145)
WBC # BLD AUTO: 6.1 X10(3) UL (ref 4–11)

## 2020-03-03 PROCEDURE — 99315 NF DSCHRG MGMT 30 MIN/LESS: CPT | Performed by: FAMILY MEDICINE

## 2020-03-03 PROCEDURE — 80053 COMPREHEN METABOLIC PANEL: CPT

## 2020-03-03 PROCEDURE — 82962 GLUCOSE BLOOD TEST: CPT

## 2020-03-03 PROCEDURE — 29581 APPL MULTLAYER CMPRN SYS LEG: CPT

## 2020-03-03 PROCEDURE — 85025 COMPLETE CBC W/AUTO DIFF WBC: CPT

## 2020-03-03 NOTE — PROGRESS NOTES
Subjective    Chief Complaint  This information was obtained from the patient  Pt here for a f/u to wound on left leg. Pt states no new complaints or pain.     Allergies  No known allergies    HPI  This information was obtained from the patient, chart  2-13 3-3-20 patient returns today, she is getting discharged from the [facility] tomorrow. she is doing well overall, by measurement i would have expected a larger reduction in her edema measurements, her skin however is much improved, wound is smaller.  dr Shawna Pate also Wound #2 Left, Lateral Ankle is an acute Full Thickness Abscess and has received a status of Not Healed. Subsequent wound encounter measurements are 2cm length x 0.7cm width x 0.6cm depth, with an area of 1.4 sq cm and a volume of 0.84 cubic cm.  No tunneli Judgment and insight intact. Alert and oriented times 3. No evidence of depression, anxiety, or agitation. Calm, cooperative, and communicative. Appropriate interactions and affect. Assessment    Active Problems    ICD-10  (Encounter Diagnosis) L97. Reviewed and evaluated labs. - Feb 2020 bun 4, creat 0.47, gfr 120  Wound type: - cellulitis  Wound improving. No s/s of infection.   Perfusion assessed by doppler. - strong pulsatile signals at distal digits 1-5 on the left  Perfusion assessed by palpation

## 2020-03-03 NOTE — PROGRESS NOTES
INFECTIOUS DISEASE PROGRESS NOTE    Yvan Hyatt Patient Status:  Inpatient    1975 MRN [id number]   Location [address] [facility]       PCP No primary care provider on file.      Antibiotics:#30-cefazolin      Subjective:  Schedule

## 2020-03-04 ENCOUNTER — SNF DISCHARGE (OUTPATIENT)
Dept: INTERNAL MEDICINE CLINIC | Age: 45
End: 2020-03-04

## 2020-03-04 VITALS
DIASTOLIC BLOOD PRESSURE: 89 MMHG | OXYGEN SATURATION: 96 % | HEART RATE: 98 BPM | SYSTOLIC BLOOD PRESSURE: 151 MMHG | TEMPERATURE: 98 F | RESPIRATION RATE: 19 BRPM

## 2020-03-04 DIAGNOSIS — Z79.899 MEDICATION MANAGEMENT: ICD-10-CM

## 2020-03-04 DIAGNOSIS — L03.116 CELLULITIS AND ABSCESS OF LEFT LEG: Primary | ICD-10-CM

## 2020-03-04 DIAGNOSIS — Z79.2 RECEIVING INTRAVENOUS ANTIBIOTIC TREATMENT AS OUTPATIENT: ICD-10-CM

## 2020-03-04 DIAGNOSIS — Z71.89 CARE PLAN DISCUSSED WITH PATIENT: ICD-10-CM

## 2020-03-04 DIAGNOSIS — L02.416 CELLULITIS AND ABSCESS OF LEFT LEG: Primary | ICD-10-CM

## 2020-03-04 PROCEDURE — 1111F DSCHRG MED/CURRENT MED MERGE: CPT | Performed by: NURSE PRACTITIONER

## 2020-03-04 PROCEDURE — 99316 NF DSCHRG MGMT 30 MIN+: CPT | Performed by: NURSE PRACTITIONER

## 2020-03-04 NOTE — PROGRESS NOTES
Mahesh Saeed, 95/1975, 38year old, female is being discharged from [address] [place] [address] at [facility]    Date of Admission: 2/17/20 (was readmitted after 6 days)  Date of Discharge: 3/4/20                          Admitting sleep aid  · Upon discharge, this patient will need home health nursing for disease and medication management, PT/OT to evaluate and treat, CNA/bath aid and DME as recommended by rehab therapy staff.     · Prescriptions provided; refills as per PCP/speciali

## 2020-03-09 NOTE — PROGRESS NOTES
[facility] Author: Edvin Rocha MD     1975 MRN [id number]   [facility]  Admission 20      Last Hospital Discharge 20 PCP No primary care provider on file.    Hospital of Discharge 20       Date of every 4 (four) hours as needed for Pain.  traMADol HCl 50 MG Oral Tab, Take 1 tablet (50 mg total) by mouth every 6 (six) hours as needed for Pain. ondansetron 4 MG Oral Tablet Dispersible, Take 4 mg by mouth every 8 (eight) hours as needed for Nausea.   Sophy Cohen discharge. Pupils: Pupils are equal, round, and reactive to light. Neck:      Musculoskeletal: Normal range of motion and neck supple. Thyroid: No thyromegaly. Vascular: No JVD. Trachea: No tracheal deviation.    Cardiovascular:      R PATIENT STATED HISTORY: (As transcribed by Technologist)  Patient left lower leg cellulitis noticed Thursday afternoon. Difficulty  walking. FINDINGS:  No evidence of acute displaced fracture or dislocation. Normal mineralization.   Diffuse soft tissue cellulitis involving the left lower leg with small focus of myositis involving the medial head of gastrocnemius muscle. No evidence of drainable abscess or osteomyelitis.     Dictated by: Renato Dow MD on 2/04/2020 at 21:06     Approved by: Renato Dow mellitus without complication, without long-term current use of insulin ([facility] [address]  -IV abx until 3/16/20, will see wound care today.   -continue with tight sugars control, contineu all medication.    -wound care  -establish with PCP upon discharge  -watching la

## 2020-03-10 ENCOUNTER — OFFICE VISIT (OUTPATIENT)
Dept: WOUND CARE | Facility: HOSPITAL | Age: 45
End: 2020-03-10
Attending: NURSE PRACTITIONER
Payer: COMMERCIAL

## 2020-03-10 DIAGNOSIS — L03.116 LEFT LEG CELLULITIS: ICD-10-CM

## 2020-03-10 DIAGNOSIS — M79.A22 NONTRAUMATIC COMPARTMENT SYNDROME OF LEFT LOWER EXTREMITY: ICD-10-CM

## 2020-03-10 DIAGNOSIS — E11.622 TYPE 2 DIABETES MELLITUS WITH OTHER SKIN ULCER, WITHOUT LONG-TERM CURRENT USE OF INSULIN (HCC): Primary | ICD-10-CM

## 2020-03-10 LAB — GLUCOSE BLD-MCNC: 117 MG/DL (ref 70–99)

## 2020-03-10 PROCEDURE — 82962 GLUCOSE BLOOD TEST: CPT

## 2020-03-10 PROCEDURE — 29581 APPL MULTLAYER CMPRN SYS LEG: CPT

## 2020-03-10 RX ORDER — PENICILLIN V POTASSIUM 500 MG/1
500 TABLET ORAL 2 TIMES DAILY
Qty: 60 TABLET | Refills: 1 | Status: SHIPPED | OUTPATIENT
Start: 2020-03-10 | End: 2020-04-09

## 2020-03-10 NOTE — PROGRESS NOTES
Subjective    Chief Complaint  This information was obtained from the patient  Pt here for a f/u to wound on left leg. She is home now. Patient states she is not happy with [facility], they have not contacted her yet.  She called social service at [place] 2-25-20 patient returns today, she was admitted from feb 13-17. Pt was admitted to the orth/spine unit and was continued on IV abx by ID who was on consult. Pt was seen by orthopedic surgery and  Went for a I&D of there left lower extremity.  Pt  Was foun Wound Assessment(s)  Wound #2 Left, Lateral Ankle is an acute Full Thickness Abscess and has received a status of Not Healed.  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

## 2020-03-10 NOTE — PROGRESS NOTES
INFECTIOUS DISEASE PROGRESS NOTE    Maria Carry Patient Status:  Inpatient    1975 MRN [id number]   Location [address] [facility]       PCP No primary care provider on file.      Antibiotics:#38-cefazolin      Subjective:  Continue

## 2020-03-17 ENCOUNTER — APPOINTMENT (OUTPATIENT)
Dept: WOUND CARE | Facility: HOSPITAL | Age: 45
End: 2020-03-17
Attending: NURSE PRACTITIONER
Payer: COMMERCIAL

## 2020-03-24 ENCOUNTER — OFFICE VISIT (OUTPATIENT)
Dept: WOUND CARE | Facility: HOSPITAL | Age: 45
End: 2020-03-24
Attending: NURSE PRACTITIONER
Payer: COMMERCIAL

## 2020-03-24 DIAGNOSIS — E11.622 TYPE 2 DIABETES MELLITUS WITH OTHER SKIN ULCER, WITHOUT LONG-TERM CURRENT USE OF INSULIN (HCC): Primary | ICD-10-CM

## 2020-03-24 DIAGNOSIS — L03.116 LEFT LEG CELLULITIS: ICD-10-CM

## 2020-03-24 LAB — GLUCOSE BLD-MCNC: 114 MG/DL (ref 70–99)

## 2020-03-24 PROCEDURE — 99213 OFFICE O/P EST LOW 20 MIN: CPT

## 2020-03-24 PROCEDURE — 82962 GLUCOSE BLOOD TEST: CPT

## 2020-03-24 NOTE — PROGRESS NOTES
Subjective    Chief Complaint  This information was obtained from the patient  Pt here for a f/u to wound on left leg. [facility] going to see patient, transitioned into circaid Friday.  Penicillin for 30days, has an appointment with Dr. Adina John next mon 2-25-20 patient returns today, she was admitted from feb 13-17. Pt was admitted to the orth/spine unit and was continued on IV abx by ID who was on consult. Pt was seen by orthopedic surgery and  Went for a I&D of there left lower extremity.  Pt  Was foun Constitutional Symptoms (General Health):  Fatigue, Fever, Loss of Appetite, Marked Weight Change, Night Sweats, Chills  Respiratory: Cough, Shortness of Breath  Cardiovascular (Central/Peripheral): Chest Pain, Dyspnea on Exertion, Intermittent Claudication dp/pt palpable left. left lower Extremities free of varicosities, +edema stable on the left. Capillary refill < 3 seconds. Digits are warm. toenails are wnl for color, thickness and hygeine, slightly long in length. no hairgrowth on legs and feet. .    Allyson Andrade d/w patient due to pandemic and the patient is overall doing well and it is a matter of time that the wound bed will re-epithelize. control of edema is most important.   will release patient from wound clinic to return to work from home with intermittent e

## 2022-03-24 ENCOUNTER — OFFICE VISIT (OUTPATIENT)
Dept: SURGERY | Facility: CLINIC | Age: 47
End: 2022-03-24
Payer: COMMERCIAL

## 2022-03-24 ENCOUNTER — DOCUMENTATION ONLY (OUTPATIENT)
Dept: SURGERY | Facility: CLINIC | Age: 47
End: 2022-03-24

## 2022-03-24 VITALS
HEART RATE: 116 BPM | HEIGHT: 64 IN | WEIGHT: 293 LBS | DIASTOLIC BLOOD PRESSURE: 80 MMHG | SYSTOLIC BLOOD PRESSURE: 142 MMHG | BODY MASS INDEX: 50.02 KG/M2 | OXYGEN SATURATION: 96 %

## 2022-03-24 DIAGNOSIS — E66.01 MORBID OBESITY WITH BODY MASS INDEX (BMI) OF 60.0 TO 69.9 IN ADULT (HCC): Primary | ICD-10-CM

## 2022-03-24 DIAGNOSIS — K21.9 GASTROESOPHAGEAL REFLUX DISEASE WITHOUT ESOPHAGITIS: ICD-10-CM

## 2022-03-24 NOTE — PROGRESS NOTES
Oriented pt to the bariatric program; provided/reviewed bariatric packet of info, time line, referrals, etc; pt agreed and verbalized understanding; attended seminar on 12/20/21.

## 2022-05-17 ENCOUNTER — OFFICE VISIT (OUTPATIENT)
Dept: INTERNAL MEDICINE CLINIC | Facility: CLINIC | Age: 47
End: 2022-05-17
Payer: COMMERCIAL

## 2022-05-17 VITALS
WEIGHT: 293 LBS | DIASTOLIC BLOOD PRESSURE: 80 MMHG | OXYGEN SATURATION: 96 % | BODY MASS INDEX: 48.82 KG/M2 | HEIGHT: 65 IN | RESPIRATION RATE: 18 BRPM | HEART RATE: 116 BPM | SYSTOLIC BLOOD PRESSURE: 144 MMHG

## 2022-05-17 DIAGNOSIS — Z51.81 THERAPEUTIC DRUG MONITORING: Primary | ICD-10-CM

## 2022-05-17 DIAGNOSIS — E11.9 TYPE 2 DIABETES MELLITUS WITHOUT COMPLICATION, WITHOUT LONG-TERM CURRENT USE OF INSULIN (HCC): ICD-10-CM

## 2022-05-17 DIAGNOSIS — R06.83 SNORING: ICD-10-CM

## 2022-05-17 DIAGNOSIS — E66.01 MORBID OBESITY WITH BMI OF 70 AND OVER, ADULT (HCC): ICD-10-CM

## 2022-05-17 DIAGNOSIS — I10 BENIGN ESSENTIAL HTN: ICD-10-CM

## 2022-05-17 PROCEDURE — 3077F SYST BP >= 140 MM HG: CPT | Performed by: NURSE PRACTITIONER

## 2022-05-17 PROCEDURE — 99204 OFFICE O/P NEW MOD 45 MIN: CPT | Performed by: NURSE PRACTITIONER

## 2022-05-17 PROCEDURE — 3008F BODY MASS INDEX DOCD: CPT | Performed by: NURSE PRACTITIONER

## 2022-05-17 PROCEDURE — 3079F DIAST BP 80-89 MM HG: CPT | Performed by: NURSE PRACTITIONER

## 2022-05-17 RX ORDER — FLUOXETINE 10 MG/1
10 CAPSULE ORAL DAILY
Qty: 30 CAPSULE | Refills: 1 | Status: SHIPPED | OUTPATIENT
Start: 2022-05-17

## 2022-05-17 NOTE — PATIENT INSTRUCTIONS
We are here to support you with weight loss, but please remember that you still need your primary care provider for your routine health maintenance. PLAN:  Get blood work done  [employer] (1 tablet by mouth daily)  Try out some pre-made meal options: del winchester MD, metabolic meals, Factor 75, Freshly   Follow up with me in 4 weeks  Schedule follow up appointments: Augustus Farah (dietitian) or Aniya Moss (presurgery dietitian)   Check for insurance coverage for dietitian and labwork prior to scheduling appointment. Please try to work on the following dietary changes:  1. Goals: Aim for 20-30 grams of protein/ meal  i. Aim for 200 grams of carbohydrates/day  ii. Eat 4-6 vegetables/day  iii. Avoid skipping meals- eat every 4-5 hours  iv. Aim for 3 meals/day  2. Drink lots of water and cut down on soda/juice consumption if soda/juice drinker  3. Focus on protein: (15-30 grams with each meal) ie. greek yogurt, cottage cheese, string cheese, hard boiled eggs  4. Healthy snacks: peanut butter and apples, hummus and carrots, berries, nuts (1/4 cup), tuna and crackers                 Protein Shakes: Premier protein or Core Power                Protein Bars: Rx Bars, Oatmega, Power Crunch                 Sargento balanced breaks (cheese and nuts)- without chocolate  5. Reduce carbohydrates which includes sweets as well as rice, pasta, potatoes, bread, corn and instead choose whole grain options or more protein or vegetables (4-6 servings of vegetables per day)  6. Get a good night of sleep  7. Try to decrease stress in life     Please download apps:  1. \"My Fitness Pal\" (other option is Lose it)) to help you to monitor daily dietary intake and you will be able to see if you are eating the right amount of calories, protein, carbs                With My Fitness Pal-->When you set-up the sage or need to adjust settings:                Goals should include:                  Lose 1.5-2 lbs per week Activity level: not very active (can't count exercise towards calorie number per day)                   ** Daily INPUT> Look at nutrition section-- \"nutrients\" and it will break down your macros for the day (ie. Protein, carbs, fibers, sugars and fats). Try to stay within these numbers daily     2. \"7 minute workout\" to help with exercise/activity which takes 7 minutes of your day and that you can do at home! 3. \"Calm\" or \"Headspace\" which helps with mindfulness, meditation, clarity, sleep, and elaina to your daily life. 4. mySchoolNotebook blog for healthy recipe ideas  5. [employer] for low carb resources    HIGH PROTEIN SNACK IDEAS  -cottage cheese  -plain yogurt  -kefir  -hard-boiled eggs  -natural cheeses  -nuts (measure portion size)   -unsweetened nut butters  -dried edamame   -jero seeds soaked in water or almond milk  -soy nuts  -cured meats (monitor for sodium issues)   -hummus with vegetables  -bean dip with vegetables     FRUIT  Low carb fruit options   Raspberries: Half a cup (60 grams) contains 3 grams of carbs. Blackberries: Half a cup (70 grams) contains 4 grams of carbs. Strawberries: Half a cup (100 grams) contains 6 grams of carbs. Blueberries: Half a cup (50 grams) contains 6 grams of carbs. Plum: One medium-sized (80 grams) contains 6 grams of carbs.      VEGETABLES  Low carb vegetables

## 2022-06-03 ENCOUNTER — OFFICE VISIT (OUTPATIENT)
Dept: INTERNAL MEDICINE CLINIC | Facility: CLINIC | Age: 47
End: 2022-06-03
Payer: COMMERCIAL

## 2022-06-03 DIAGNOSIS — E66.01 MORBID OBESITY WITH BMI OF 70 AND OVER, ADULT (HCC): ICD-10-CM

## 2022-06-03 DIAGNOSIS — Z51.81 THERAPEUTIC DRUG MONITORING: ICD-10-CM

## 2022-06-03 DIAGNOSIS — I10 BENIGN ESSENTIAL HTN: ICD-10-CM

## 2022-06-03 DIAGNOSIS — E11.9 TYPE 2 DIABETES MELLITUS WITHOUT COMPLICATION, WITHOUT LONG-TERM CURRENT USE OF INSULIN (HCC): ICD-10-CM

## 2022-06-03 NOTE — PATIENT INSTRUCTIONS
Goals: 1. Keep a food record, My [employer]z or My Net Diary. 2.  Aim to consume 4-6 meals/sancks per day. Include protein and produce. Aim for 45-57 grams of protein per day. Try to keep the carbohydrates at 100-120 grams per day or less. 3. Aim to drink 64 oz of water per day. 4.  Practice eating strategies, chew well before swallowing, drink separately from eating. 5.  Do 5 minutes of walking per day. 6.  Do 10 minutes of strength training 3 times per week. (Sit and Be Fit YouTube).

## 2022-06-23 ENCOUNTER — APPOINTMENT (OUTPATIENT)
Dept: WOUND CARE | Facility: HOSPITAL | Age: 47
End: 2022-06-23
Attending: NURSE PRACTITIONER
Payer: COMMERCIAL

## 2022-06-23 ENCOUNTER — TELEPHONE (OUTPATIENT)
Dept: WOUND CARE | Facility: HOSPITAL | Age: 47
End: 2022-06-23

## 2022-06-23 NOTE — TELEPHONE ENCOUNTER
Patient called unsure if she should come in for her scheduled appointment - area no longer draining. Asked if there was dry patches or scabbing? She talked about having the flu and noting a rash and blistering. Pt stated she was going to the ER when this happened earlier in the week but then started feeling better and \"everything was drying up and rash was gone. \" Recommended lymphedema clinic, number provided. Patient to monitor area, cleanse and moisturize daily. She verbalizes understanding and will call lymphedema clinic for an appointment. Wound clinic appointment cancelled.

## (undated) DEVICE — SUTURE VICRYL 0 CT-1

## (undated) DEVICE — GAUZE SPONGES,12 PLY: Brand: CURITY

## (undated) DEVICE — CHLORAPREP 26ML APPLICATOR

## (undated) DEVICE — ZIMMER® STERILE DISPOSABLE TOURNIQUET CUFF WITH PLC, DUAL PORT, SINGLE BLADDER, 34 IN. (86 CM)

## (undated) DEVICE — UNDYED BRAIDED (POLYGLACTIN 910), SYNTHETIC ABSORBABLE SUTURE: Brand: COATED VICRYL

## (undated) DEVICE — SYRINGE 10ML LL TIP

## (undated) DEVICE — STERILE POLYISOPRENE POWDER-FREE SURGICAL GLOVES: Brand: PROTEXIS

## (undated) DEVICE — LOWER EXTREMITY CDS-LF: Brand: MEDLINE INDUSTRIES, INC.

## (undated) DEVICE — NON-ADHERENT STRIPS,OIL EMULSION: Brand: CURITY

## (undated) DEVICE — 3M™ STERI-DRAPE™ U-DRAPE 1015: Brand: STERI-DRAPE™

## (undated) DEVICE — KENDALL SCD EXPRESS SLEEVES, KNEE LENGTH, MEDIUM: Brand: KENDALL SCD

## (undated) DEVICE — GAMMEX® PI HYBRID SIZE 8.5, STERILE POWDER-FREE SURGICAL GLOVE, POLYISOPRENE AND NEOPRENE BLEND: Brand: GAMMEX

## (undated) DEVICE — SOL  .9 3000ML

## (undated) DEVICE — STANDARD HYPODERMIC NEEDLE,POLYPROPYLENE HUB: Brand: MONOJECT

## (undated) DEVICE — CONVERTORS STOCKINETTE: Brand: CONVERTORS

## (undated) DEVICE — SPECIMEN CONTAINER,POSITIVE SEAL INDICATOR, OR PACKAGED: Brand: PRECISION

## (undated) DEVICE — STERILE HOOK LOCK LATEX FREE ELASTIC BANDAGE 6INX5YD: Brand: HOOK LOCK™

## (undated) DEVICE — STRL PENROSE DRAIN 18" X 1/4": Brand: CARDINAL HEALTH

## (undated) DEVICE — FAN SPRAY KIT: Brand: PULSAVAC®

## (undated) DEVICE — PADDING CAST SOFT ROLL 3\" STER

## (undated) NOTE — IP AVS SNAPSHOT
Patient Demographics     Address  [address] [place] [address] [address] [address] Phone  [phone] [facility])      Emergency Contact(s)     Name Relation Home Work Mobile    Rigo Lentz Friend [phone]        Allergies as of 2/7/2020  Review status set to Review C Phone:  [phone]   metFORMIN HCl 500 MG Tabs     Please  your prescriptions at the location directed by your doctor or nurse    Bring a paper prescription for each of these medications  ceFAZolin sodium 2 GM/20ML Sosy           [id number] - MAR Ordering provider:  Shankar Cote MD  02/06/20 1767 Resulting lab:  [facility]    Specimen Information    Type Source Collected On   Blood — 02/07/20 0880          Components    Component Value Reference Range Flag Lab   Glucose 118 70 - 99 mg/dL H E Susceptibility      Staphylococcus aureus     Not Specified    Cefazolin  Sensitive    Clindamycin  Resistant    Erythromycin >=8  Resistant    Gentamicin <=0.5  Sensitive    Levofloxacin <=0.12  Sensitive    Oxacillin <=0.25  Sensitive    Trimethoprim/[place] Medications:[AR.1]  No current facility-administered medications on file prior to encounter. No current outpatient medications on file prior to encounter.[AR.2]      Review of Systems:   A comprehensive 14 point review of systems was completed.     Pertin 3. F/U US  4. Consider ID eval if no improvement  2. Sepsis   1. Received fluid bolus in ED  2. IVF  3. IV abx  4. Trend lactic[AR.1]   5. Monitor CBC[AR.3]  3. Morbid Obesity[AR.1]  1. BMI 51  2. Advised weight loss[AR.3]  4. Hyperglycemia  1.  Check A1c her temperature at home[AR.3]. She denies any N/V/D/C or abd pain. No CP or SOB. Past Medical History:[AR.1]  Past Medical History:   Diagnosis Date   • Obesity[AR.2]         Past Surgical History:[AR.1] No past surgical history on file. [AR.2]    Soci *   BUN 19*   CREATSERUM 1.02   GFRAA 77   GFRNAA 67   CA 9.0   ALB 2.8*      K 3.5      CO2 29.0   ALKPHO 124*   AST 18   ALT 24   BILT 0.5   TP 8.4*       Estimated Creatinine Clearance: 60.1 mL/min (based on SCr of 1.02 mg/dL).     No INFECTIOUS DISEASE CONSULTATION    Nataliemyles Yobani Patient Status:  Inpatient    1975 MRN [id number]   [facility] 4NW-A Attending Jermaine Kline MD   [address] Day # 1 PCP No primary ca •  influenza vaccine split quad (FLULAVAL) ages 7 months to 59 years inj 0.5ml, 0.5 mL, Intramuscular, Prior to discharge  •  ibuprofen (MOTRIN) tab 400 mg, 400 mg, Oral, Q6H PRN  •  melatonin tab TABS 1 mg, 1 mg, Oral, Nightly PRN  No current facility-adm Hospital Encounter on 02/01/20   1.  AEROBIC BACTERIAL CULTURE     Status: None (Preliminary result)    Collection Time: 02/01/20  2:34 PM   Result Value Ref Range    Aerobic Smear No WBCs seen N/A    Aerobic Smear No organisms seen N/A       Established Pr Author:  Merrill School, PT Service:  Rehab Author Type:  Physical Therapist    Filed:  2/7/2020  2:58 PM Date of Service:  2/7/2020  2:54 PM Status:  Signed    :  Merrill School, PT (Physical Therapist)        PHYSICAL THERAPY TREATMENT NOTE - IN How much help from another person does the patient currently need. ..   -   Moving to and from a bed to a chair (including a wheelchair)?: A Little   -   Need to walk in hospital room?: A Little   -   Climbing 3-5 steps with a railing?: A Lot       AM-PAC S PT Treatment Plan: Bed mobility; Body mechanics; Endurance; Energy conservation;Patient education; Family education;Gait training;Strengthening;Stair training;Transfer training;Balance training;Range of motion  Rehab Potential : Good  Frequency (Obs): 3x/week Prior Level of [place]: Pt reports was ind with all mobility and ADLs. Pt reports typically able to care for self and 9 y/o. SUBJECTIVE  \"I'm worried they will look at me and think I can't do anything. .. I can! \"    Patient self-stated goal is to Skilled Therapy Provided: Pt received supine in bed, agreeable to PT. Pt demos supine to sit with supervision, use of bed railing and increased momentum. Sitting eob without LOB. Sit to/from stand with supervision and repeated rocking.   Pt reaching for DISCHARGE RECOMMENDATIONS  PT Discharge Recommendations: Sub-acute rehabilitation    PLAN  PT Treatment Plan: Bed mobility; Body mechanics; Endurance; Energy conservation;Patient education; Family education;Gait training;Strengthening;Stair training;Transfer t Presenting Problem: LLE cellulitis    Physician Order: IP Consult to Occupational Therapy  Reason for Therapy: ADL/IADL Dysfunction and Discharge Planning    History related to current admission: Pt admitted 2/1/2020 for Left leg cellulitis [L03.116].  Pt w O2 SATURATIONS                ACTIVITIES OF DAILY LIVING ASSESSMENT  AM-PAC ‘6-Clicks’ Inpatient Daily Activity Short Form  How much help from another person does the patient currently need…  -   Putting on and taking off regular lower body clothing?: CAMILLE Rojas In this OT evaluation patient presents with the following performance deficits: pain management, strength, posture, endurance, functional reach, use of adaptive techniques.  These deficits impact the patient’s ability to participate in ADL, transfers, inst Patient will transfer from supine to sit:  with modified independent  Patient will transfer from sit to stand:  with modified independent  Patient will transfer to toilet:  with modified independent    UE Exercise Program Goal  Patient will be supervision

## (undated) NOTE — IP AVS SNAPSHOT
[address]            (For Outpatient Use Only) Initial Admit Date: 2/1/2020   Inpt/Obs Admit Date: Inpt: 2/1/20 / Obs: N/A   Discharge Date:    Cale Larson:  [de-identified]   MRN: [de-identified]   CSN: [id number]   CEID: [id number] February 7, 2020

## (undated) NOTE — LETTER
Jasmyn Sheppard [address] 6 [address] E  [place], [address] [place] [address]    Consent for Operation  Date: __________________                                Time: _______________    1.  I authorize the performance upon Marianne Allen the following operation:  Procedur procedure has been videotaped, the surgeon will obtain the original videotape. The hospital will not be responsible for storage or maintenance of this tape.   7. For the purpose of advancing medical education, I consent to the admittance of observers to the STATEMENTS REQUIRING INSERTION OR COMPLETION WERE FILLED IN.     Signature of Patient:   ___________________________    When the patient is a minor or mentally incompetent to give consent:  Signature of person authorized to consent for patient: ____________ supplements, and pills I can buy without a prescription (including street drugs/illegal medications). Failure to inform my anesthesiologist about these medicines may increase my risk of anesthetic complications. iv.  If I am allergic to anything or have ha Anesthesiologist Signature     Date   Time  I have discussed the procedure and information above with the patient (or patient’s representative) and answered their questions. The patient or their representative has agreed to have anesthesia services.     ___